# Patient Record
Sex: MALE | Race: WHITE | NOT HISPANIC OR LATINO | URBAN - METROPOLITAN AREA
[De-identification: names, ages, dates, MRNs, and addresses within clinical notes are randomized per-mention and may not be internally consistent; named-entity substitution may affect disease eponyms.]

---

## 2017-08-01 ENCOUNTER — OFFICE VISIT - HEALTHEAST (OUTPATIENT)
Dept: FAMILY MEDICINE | Facility: CLINIC | Age: 37
End: 2017-08-01

## 2017-08-01 DIAGNOSIS — F41.9 ANXIETY: ICD-10-CM

## 2017-08-01 DIAGNOSIS — F19.11 HISTORY OF DRUG ABUSE IN REMISSION (H): ICD-10-CM

## 2017-08-01 DIAGNOSIS — F90.9 ADHD (ATTENTION DEFICIT HYPERACTIVITY DISORDER): ICD-10-CM

## 2017-08-01 DIAGNOSIS — F10.10 ALCOHOL ABUSE: ICD-10-CM

## 2017-08-01 DIAGNOSIS — F33.9 MAJOR DEPRESSION, RECURRENT (H): ICD-10-CM

## 2017-08-01 ASSESSMENT — MIFFLIN-ST. JEOR: SCORE: 1616.98

## 2017-09-15 ENCOUNTER — COMMUNICATION - HEALTHEAST (OUTPATIENT)
Dept: FAMILY MEDICINE | Facility: CLINIC | Age: 37
End: 2017-09-15

## 2017-09-26 ENCOUNTER — OFFICE VISIT - HEALTHEAST (OUTPATIENT)
Dept: FAMILY MEDICINE | Facility: CLINIC | Age: 37
End: 2017-09-26

## 2017-09-26 DIAGNOSIS — F33.9 MAJOR DEPRESSION, RECURRENT (H): ICD-10-CM

## 2017-09-26 DIAGNOSIS — Z23 NEED FOR VACCINATION: ICD-10-CM

## 2017-09-26 DIAGNOSIS — F41.9 ANXIETY: ICD-10-CM

## 2017-09-26 DIAGNOSIS — H61.23 BILATERAL HEARING LOSS DUE TO CERUMEN IMPACTION: ICD-10-CM

## 2017-09-26 ASSESSMENT — MIFFLIN-ST. JEOR: SCORE: 1581.26

## 2017-10-16 ENCOUNTER — COMMUNICATION - HEALTHEAST (OUTPATIENT)
Dept: FAMILY MEDICINE | Facility: CLINIC | Age: 37
End: 2017-10-16

## 2017-10-19 ENCOUNTER — COMMUNICATION - HEALTHEAST (OUTPATIENT)
Dept: FAMILY MEDICINE | Facility: CLINIC | Age: 37
End: 2017-10-19

## 2017-10-19 DIAGNOSIS — F41.9 ANXIETY: ICD-10-CM

## 2017-10-31 ENCOUNTER — OFFICE VISIT - HEALTHEAST (OUTPATIENT)
Dept: FAMILY MEDICINE | Facility: CLINIC | Age: 37
End: 2017-10-31

## 2017-10-31 DIAGNOSIS — Z20.2 EXPOSURE TO STD: ICD-10-CM

## 2017-10-31 ASSESSMENT — MIFFLIN-ST. JEOR: SCORE: 1616.13

## 2017-11-01 LAB — HIV 1+2 AB+HIV1 P24 AG SERPL QL IA: NEGATIVE

## 2017-11-02 LAB — SYPHILIS RPR SCREEN - HISTORICAL: NORMAL

## 2017-11-03 LAB
HSV1 IGG SERPL QL IA: NEGATIVE
HSV2 IGG SERPL QL IA: POSITIVE

## 2017-11-27 ENCOUNTER — COMMUNICATION - HEALTHEAST (OUTPATIENT)
Dept: FAMILY MEDICINE | Facility: CLINIC | Age: 37
End: 2017-11-27

## 2018-02-08 ENCOUNTER — COMMUNICATION - HEALTHEAST (OUTPATIENT)
Dept: FAMILY MEDICINE | Facility: CLINIC | Age: 38
End: 2018-02-08

## 2018-02-08 DIAGNOSIS — F10.10 ALCOHOL ABUSE: ICD-10-CM

## 2018-02-08 DIAGNOSIS — F41.9 ANXIETY: ICD-10-CM

## 2018-02-08 DIAGNOSIS — F33.9 MAJOR DEPRESSION, RECURRENT (H): ICD-10-CM

## 2018-03-27 ENCOUNTER — COMMUNICATION - HEALTHEAST (OUTPATIENT)
Dept: FAMILY MEDICINE | Facility: CLINIC | Age: 38
End: 2018-03-27

## 2018-03-27 DIAGNOSIS — F41.9 ANXIETY: ICD-10-CM

## 2018-03-27 DIAGNOSIS — F10.10 ALCOHOL ABUSE: ICD-10-CM

## 2018-03-27 DIAGNOSIS — F33.9 MAJOR DEPRESSION, RECURRENT (H): ICD-10-CM

## 2019-01-16 ENCOUNTER — COMMUNICATION - HEALTHEAST (OUTPATIENT)
Dept: FAMILY MEDICINE | Facility: CLINIC | Age: 39
End: 2019-01-16

## 2019-01-17 ENCOUNTER — OFFICE VISIT - HEALTHEAST (OUTPATIENT)
Dept: FAMILY MEDICINE | Facility: CLINIC | Age: 39
End: 2019-01-17

## 2019-01-17 DIAGNOSIS — M25.551 HIP PAIN, RIGHT: ICD-10-CM

## 2019-01-17 DIAGNOSIS — F33.41 RECURRENT MAJOR DEPRESSIVE DISORDER, IN PARTIAL REMISSION (H): ICD-10-CM

## 2019-01-17 DIAGNOSIS — Z23 NEED FOR VACCINATION: ICD-10-CM

## 2019-01-17 DIAGNOSIS — R51.9 NONINTRACTABLE HEADACHE, UNSPECIFIED CHRONICITY PATTERN, UNSPECIFIED HEADACHE TYPE: ICD-10-CM

## 2019-01-17 ASSESSMENT — MIFFLIN-ST. JEOR: SCORE: 1743.43

## 2019-01-31 ENCOUNTER — RECORDS - HEALTHEAST (OUTPATIENT)
Dept: ADMINISTRATIVE | Facility: OTHER | Age: 39
End: 2019-01-31

## 2019-02-14 ENCOUNTER — RECORDS - HEALTHEAST (OUTPATIENT)
Dept: ADMINISTRATIVE | Facility: OTHER | Age: 39
End: 2019-02-14

## 2019-05-16 ENCOUNTER — COMMUNICATION - HEALTHEAST (OUTPATIENT)
Dept: FAMILY MEDICINE | Facility: CLINIC | Age: 39
End: 2019-05-16

## 2020-11-18 ENCOUNTER — RECORDS - HEALTHEAST (OUTPATIENT)
Dept: ADMINISTRATIVE | Facility: OTHER | Age: 40
End: 2020-11-18

## 2020-11-25 ENCOUNTER — RECORDS - HEALTHEAST (OUTPATIENT)
Dept: ADMINISTRATIVE | Facility: OTHER | Age: 40
End: 2020-11-25

## 2021-05-27 ENCOUNTER — RECORDS - HEALTHEAST (OUTPATIENT)
Dept: ADMINISTRATIVE | Facility: CLINIC | Age: 41
End: 2021-05-27

## 2021-05-28 NOTE — TELEPHONE ENCOUNTER
Who is calling:  Leo  Reason for Call:  Completed PHQ9    Date of last appointment with primary care:  N/a  Okay to leave a detailed message: No

## 2021-05-28 NOTE — TELEPHONE ENCOUNTER
Little interest or pleasure in doing things: Not at all  Feeling down, depressed, or hopeless: Several days  Trouble falling or staying asleep, or sleeping too much: Not at all  Feeling tired or having little energy: Several days  Poor appetite or overeating: More than half the days  Feeling bad about yourself - or that you are a failure or have let yourself or your family down: Several days  Trouble concentrating on things, such as reading the newspaper or watching television: Several days  Moving or speaking so slowly that other people could have noticed. Or the opposite - being so fidgety or restless that you have been moving around a lot more than usual: Not at all  Thoughts that you would be better off dead, or of hurting yourself in some way: Not at all  PHQ-9 Total Score: 6  If you checked off any problems, how difficult have these problems made it for you to do your work, take care of things at home, or get along with other people?: (Not dfifficult at all)  Depression Follow-up Plan: ([place])

## 2021-05-28 NOTE — TELEPHONE ENCOUNTER
Doesn't look like patient is on any antidepressants.  Can we do a PHQ-9 over the phone and see how he is doing.  Do not see any from his 01/2019 office visit.

## 2021-05-31 VITALS — HEIGHT: 70 IN | BODY MASS INDEX: 22.65 KG/M2 | WEIGHT: 158.25 LBS

## 2021-05-31 VITALS — HEIGHT: 70 IN | WEIGHT: 150.56 LBS | BODY MASS INDEX: 21.55 KG/M2

## 2021-05-31 VITALS — BODY MASS INDEX: 22.68 KG/M2 | HEIGHT: 70 IN | WEIGHT: 158.44 LBS

## 2021-06-02 VITALS — WEIGHT: 188.06 LBS | BODY MASS INDEX: 27.85 KG/M2 | HEIGHT: 69 IN

## 2021-06-12 NOTE — PROGRESS NOTES
Assessment / Impression     1. Anxiety     2. Alcohol abuse  Comprehensive Metabolic Panel   3. Major depression, recurrent     4. ADHD (attention deficit hyperactivity disorder)     5. History of drug abuse in remission         The following are part of a depression follow up plan for the patient:  mental health care assessment    Plan:     I encouraged him to continue his efforts to stop drinking and to seek help for his worsening anxiety depression symptoms.  Recommended that we check a CMP today.  He has a meeting tomorrow morning at the alcohol and drug abuse program ([facility]) where they will be doing a chemical health assessment and help provide some resources.  He also has a meeting with the [facility] crisis team later this week.  He reports that they will be helping connect him with a psychiatrist and likely a therapist.  We discussed treatment options for his current symptoms and he was given a prescription for paroxetine 20 mg daily.  I also refilled the Lorazepam that he may use as needed for acute anxiety episodes.    Subjective:      HPI: Stevie Mcnally is a 36 y.o. male who presents to the clinic for follow-up after being seen in the ER at [facility] on 7/28 for worsening anxiety, depression and alcohol abuse.  He reports that he has been drinking 12-18 beers a day over the past month.  Prior to this he was drinking, but not as heavily about 5 days a week.  He describes being under quite a bit of stress.  His girlfriend of 10 years recently left him.  He has also had some stress at work.  He is an .  He was evaluated and met with the  in the emergency room on 7/28.  He is scheduled to have a meeting with the [facility] crisis team in 2 days.  He reports that they will be helping connect him with the health services such as psychiatry.  He is planning to be evaluated at the alcohol and drug abuse program for chemical health assessment tomorrow morning.  They  "did not check labs in the ER.  He was given a small prescription for Ativan.  He reports only having 1 tablet left 6.  He reports not drinking any alcohol since Sunday.  He has a history of anxiety, depression, ADHD and substance abuse.  He has not abused drugs since 2003.  He has been on Effexor in the past which she did not find helpful, but the dose was never increased from 37.5 mg.  He is also been on Wellbutrin, but this caused heart palpitations.        Review of Systems  All other systems reviewed and are negative.     History   Smoking Status     Current Every Day Smoker     Packs/day: 1.00     Years: 22.00   Smokeless Tobacco     Never Used       Family History   Problem Relation Age of Onset     COPD Father      Heart disease Father      COPD Paternal Grandmother      Brain cancer Paternal Grandfather        Objective:     /80 (Patient Site: Right Arm, Patient Position: Sitting, Cuff Size: Adult Regular)  Pulse (!) 112  Temp 98.9  F (37.2  C) (Oral)   Resp 24  Ht 5' 9.5\" (1.765 m)  Wt 158 lb 7 oz (71.9 kg)  BMI 23.06 kg/m2  Physical Examination: General appearance - alert, well appearing, and in no distress  Eyes: pupils equal and reactive, extraocular eye movements intact  Mouth: mucous membranes moist, pharynx normal without lesions  Neck: supple, no significant adenopathy  Lungs: clear to auscultation, no wheezes, rales or rhonchi, symmetric air entry  Heart: normal rate, regular rhythm, normal S1, S2, no murmurs, rubs, clicks or gallops  Abdomen: soft, nontender, nondistended, no masses or organomegaly  Neurological: alert, oriented, normal speech, no focal findings or movement disorder noted.    Extremities: No edema, no clubbing or cyanosis  Psychiatric: Appears anxious and depressed.  PHQ-9 score is 24.  SILAS-7 score is 17.    No results found for this or any previous visit (from the past 168 hour(s)).    Current Outpatient Prescriptions   Medication Sig     LORazepam (ATIVAN) 1 MG tablet " Take 1 tablet (1 mg total) by mouth every 8 (eight) hours as needed for anxiety.     PARoxetine (PAXIL) 20 MG tablet Take 1 tablet (20 mg total) by mouth daily.

## 2021-06-13 NOTE — PROGRESS NOTES
"Assessment / Impression     1. Bilateral hearing loss due to cerumen impaction     2. Major depression, recurrent     3. Anxiety       The following are part of a depression follow up plan for the patient:  mental health care assessment    Plan:     I personally removed a large amount of cerumen using the lighted spatula in the office today.  The clinical assistant removed the remainder using saline irrigation.  He tolerated this well.  Tips on how to prevent wax buildup in the future were given.    Subjective:      HPI: Stevie Mcnally is a 36 y.o. male who presents to the clinic complaining of bilateral ear plugging with decreased hearing over the past 6 months.  He denies having ear pain or drainage.  He does not feel ill and is not congested.  He has a history of anxiety and depression when she feels like her currently stable.  He takes paroxetine and Lorazepam as needed.        Review of Systems  All other systems reviewed and are negative.     History   Smoking Status     Current Every Day Smoker     Packs/day: 1.00     Years: 22.00   Smokeless Tobacco     Never Used       Family History   Problem Relation Age of Onset     COPD Father      Heart disease Father      COPD Paternal Grandmother      Brain cancer Paternal Grandfather        Objective:     /72 (Patient Site: Right Arm, Patient Position: Sitting, Cuff Size: Adult Regular)  Pulse (!) 108  Temp 98.9  F (37.2  C) (Oral)   Resp 16  Ht 5' 9.5\" (1.765 m)  Wt 150 lb 9 oz (68.3 kg)  BMI 21.92 kg/m2  Physical Examination: General appearance - alert, well appearing, and in no distress  Eyes: extraocular eye movements intact  Ears: Both canals are impacted with cerumen.  Once this was removed the canals and tympanic membranes appeared clear  Neurological: alert, oriented, normal speech, no focal findings or movement disorder noted.    Psychiatric: Normal affect. Does not appear anxious or depressed.    No results found for this or any previous " visit (from the past 168 hour(s)).    Current Outpatient Prescriptions   Medication Sig     LORazepam (ATIVAN) 1 MG tablet Take 1 tablet (1 mg total) by mouth every 8 (eight) hours as needed for anxiety.     PARoxetine (PAXIL) 20 MG tablet Take 1 tablet (20 mg total) by mouth daily.

## 2021-06-13 NOTE — PROGRESS NOTES
"Assessment / Impression     1. Exposure to STD  Chlamydia trachomatis & Neisseria gonorrhoeae, Amplified Detection    HIV Antigen/Antibody Screening [place]    Syphilis Screen, [place]    Herpes simplex Virus (Type 1 and 2) IgG Antibody         Plan:     We decided to check the above STD screening labs.  It is reassuring that he has never had an outbreak consistent with genital herpes.  If he develops this I recommended he return to the clinic to be evaluated.    Subjective:      HPI: Stevie Mcnally is a 37 y.o. male who presents to the clinic because his ex girlfriend recently told him that she tested positive for herpes.  She told him that she has never had an outbreak, but the blood test was positive.  He has never had an outbreak either.  He is not sure if he has ever had a cold sore before.  He denies having any blistering lesions, penile discharge or discomfort.        Review of Systems  All other systems reviewed and are negative.     History   Smoking Status     Current Every Day Smoker     Packs/day: 1.00     Years: 22.00   Smokeless Tobacco     Never Used       Family History   Problem Relation Age of Onset     COPD Father      Heart disease Father      COPD Paternal Grandmother      Brain cancer Paternal Grandfather        Objective:     /66 (Patient Site: Right Arm, Patient Position: Sitting, Cuff Size: Adult Regular)  Pulse 76  Temp 98  F (36.7  C) (Oral)   Resp 16  Ht 5' 9.5\" (1.765 m)  Wt 158 lb 4 oz (71.8 kg)  BMI 23.03 kg/m2  Physical Examination: General appearance - alert, well appearing, and in no distress  Eyes: pupils equal and reactive, extraocular eye movements intact  Mouth: mucous membranes moist, pharynx normal without lesions  Neck: supple, no significant adenopathy  Lungs: clear to auscultation, no wheezes, rales or rhonchi, symmetric air entry  Heart: normal rate, regular rhythm, normal S1, S2, no murmurs, rubs, clicks or gallops  Abdomen: soft, nontender, nondistended, " no masses or organomegaly  Neurological: alert, oriented, normal speech, no focal findings or movement disorder noted.    Psychiatric: Appears mildly anxious and depressed.    No results found for this or any previous visit (from the past 168 hour(s)).    Current Outpatient Prescriptions   Medication Sig     LORazepam (ATIVAN) 1 MG tablet Take 1 tablet (1 mg total) by mouth every 8 (eight) hours as needed for anxiety.     PARoxetine (PAXIL) 20 MG tablet Take 1 tablet (20 mg total) by mouth daily.

## 2021-06-16 PROBLEM — F10.11 ALCOHOL ABUSE, IN REMISSION: Status: ACTIVE | Noted: 2017-08-01

## 2021-06-23 NOTE — TELEPHONE ENCOUNTER
Patient is scheduled for 20 minute appt. at 140  Tomorrow and the note says wanting PHY.  If he wants to have PHY needs 40 minutes and we can change him to the 1200 40 minute appointment currently available if that will work for him. Please help change schedule if appt. Still available when he calls back.

## 2021-06-23 NOTE — PROGRESS NOTES
Assessment / Impression     1. Hip pain, right  Ambulatory referral to Orthopedic Surgery   2. Nonintractable headache, unspecified chronicity pattern, unspecified headache type     3. Recurrent major depressive disorder, in partial remission (H)     4. Need for vaccination  Influenza, Seasonal,Quad Inj, 36+ MOS       The following are part of a depression follow up plan for the patient:  mental health care assessment    Plan:     I expressed my concern that he is having a lot of pain in the right hip.  The range of motion is limited with internal and external hip rotation.  He was given a referral to see an orthopedic specialist for further workup.  We decided to skip doing x-rays in our clinic since they will likely be getting their own images once he sees the specialist.  He may take Tylenol as needed and modify activities until he is feeling better.    The underlying cause of his headache symptoms is not entirely clear, but it seems like his energy drink consumption is likely contributing to this.  Stress and anxiety may also be contributing factor.  I recommended he get plenty of rest, regular exercise and eat a healthy diet.  He is going to keep a headache journal to help identify triggers.  He is also going to keep working on limiting caffeine and energy drink consumption.    His anxiety and depression symptoms appear to be well controlled without medication.  We will keep an eye on these issues at future appointments.    Subjective:      HPI: Stevie Mcnally is a 38 y.o. male who presents to the clinic to discuss a couple of concerns.  He describes having a long history of right-sided hip pain that has been getting worse over the past 2 months.  He denies any specific injury that may have caused this.  He reports that there is now pain shooting into the right thigh causing some knee discomfort.  He attributes this to changing the way he walks.  He also has radiation into the groin area.  He remembers as a  "child needing to have fluid drained from his right hip, but he does not know what the underlying diagnosis was.  He was about 4-6 years old at the time.  His symptoms are currently worse with prolonged use.  He has tried ibuprofen, but this can be hard on his stomach.    He is also concerned about headaches that have been occurring over the past month.  The often will wake him up morning and start in the front of his head and moved towards the back of his head.  They usually get better throughout the day.  He denies nausea, vomiting, photophobia or phonophobia symptoms with the headaches.  He has noticed that increasing his energy drink consumption may be making this worse.  He has a history of alcohol abuse, but he has been sober for over a year.  He has not had any changes in vision, speech, cognition or balance.  He denies weakness on one side of his body.        Review of Systems  All other systems reviewed and are negative.     Social History     Tobacco Use   Smoking Status Current Every Day Smoker     Packs/day: 1.00     Years: 22.00     Pack years: 22.00   Smokeless Tobacco Never Used       Family History   Problem Relation Age of Onset     COPD Father      Heart disease Father      COPD Paternal Grandmother      Brain cancer Paternal Grandfather        Objective:     /74 (Patient Site: Right Arm, Patient Position: Sitting, Cuff Size: Adult Large)   Pulse 80   Temp 98.7  F (37.1  C) (Oral)   Resp 16   Ht 5' 9\" (1.753 m)   Wt 188 lb 1 oz (85.3 kg)   BMI 27.77 kg/m    Physical Examination: General appearance - alert, well appearing, and in no distress  Eyes: pupils equal and reactive, extraocular eye movements intact  Ears: Tympanic membranes clear bilaterally  Mouth: mucous membranes moist, pharynx normal without lesions  Neck: supple, no significant adenopathy  Lungs: clear to auscultation, no wheezes, rales or rhonchi, symmetric air entry  Heart: normal rate, regular rhythm, normal S1, S2, no " murmurs, rubs, clicks or gallops  Neurological: alert, oriented, normal speech, no focal findings or movement disorder noted.  Deep tendon reflexes 2 out of 4 bilaterally  Extremities: No edema, no clubbing or cyanosis.  Internal and external hip rotation reveals decreased range of motion bilaterally, worse on the right.  These maneuvers on the right cause pain especially with internal rotation.  Straight leg raise negative.  Back: Nontender over the cervical, thoracic and lumbar spinous processes.  Nontender over the SI joints  Psychiatric: Normal affect. Does not appear anxious or depressed.  PHQ-9 score is 4.  SILAS-7 score is 2.    No results found for this or any previous visit (from the past 168 hour(s)).    No current outpatient medications on file.

## 2021-07-03 NOTE — ADDENDUM NOTE
Addendum Note by Shantanu Anne CMA at 9/26/2017  3:34 PM     Author: Shantanu Anne CMA Service: -- Author Type: Certified Medical Assistant    Filed: 9/26/2017  3:34 PM Encounter Date: 9/26/2017 Status: Signed    : Shantanu Anne CMA (Certified Medical Assistant)    Addended by: SHANTANU ANNE on: 9/26/2017 03:34 PM        Modules accepted: Orders

## 2021-08-22 ENCOUNTER — HEALTH MAINTENANCE LETTER (OUTPATIENT)
Age: 41
End: 2021-08-22

## 2021-10-17 ENCOUNTER — HEALTH MAINTENANCE LETTER (OUTPATIENT)
Age: 41
End: 2021-10-17

## 2022-01-20 NOTE — PATIENT INSTRUCTIONS
Preventive Health Recommendations  Male Ages 40 to 49    Yearly exam:             See your health care provider every year in order to  o   Review health changes.   o   Discuss preventive care.    o   Review your medicines if your doctor has prescribed any.    You should be tested each year for STDs (sexually transmitted diseases) if you re at risk.     Have a cholesterol test every 5 years.     Have a colonoscopy (test for colon cancer) if someone in your family has had colon cancer or polyps before age 50.     After age 45, have a diabetes test (fasting glucose). If you are at risk for diabetes, you should have this test every 3 years.      Talk with your health care provider about whether or not a prostate cancer screening test (PSA) is right for you.    Shots: Get a flu shot each year. Get a tetanus shot every 10 years.     Nutrition:    Eat at least 5 servings of fruits and vegetables daily.     Eat whole-grain bread, whole-wheat pasta and brown rice instead of white grains and rice.     Get adequate Calcium and Vitamin D.     Lifestyle    Exercise for at least 150 minutes a week (30 minutes a day, 5 days a week). This will help you control your weight and prevent disease.     Limit alcohol to one drink per day.     No smoking.     Wear sunscreen to prevent skin cancer.     See your dentist every six months for an exam and cleaning.      HOW TO QUIT SMOKING  Smoking is one of the hardest habits to break. About half of all those who have ever smoked have been able to quit, and most of those (about 70%) who still smoke want to quit. Here are some of the best ways to stop smoking.     KEEP TRYING:  It takes most smokers about 8 tries before they are finally able to fully quit. So, the more often you try and fail, the better your chance of quitting the next time! So, don't give up!    GO COLD TURKEY:  Most ex-smokers quit cold turkey. Trying to cut back gradually doesn't seem to work as well, perhaps because it  continues the smoking habit. Also, it is possible to fool yourself by inhaling more while smoking fewer cigarettes. This results in the same amount of nicotine in your body!    GET SUPPORT:  Support programs can make an important difference, especially for the heavy smoker. These groups offer lectures, methods to change your behavior and peer support. Call the free national Quitline for more information. 800-QUIT-NOW (758-414-0962). Low-cost or free programs are offered by many hospitals, local chapters of the American Lung Association (901-914-3283) and the American Cancer Society (564-812-1095). Support at home is important too. Non-smokers can help by offering praise and encouragement. If the smoker fails to quit, encourage them to try again!    OVER-THE-COUNTER MEDICINES:  For those who can't quit on their own, Nicotine Replacement Therapy (NRT) may make quitting much easier. Certain aids such as the nicotine patch, gum and lozenge are available without a prescription. However, it is best to use these under the guidance of your doctor. The skin patch provides a steady supply of nicotine to the body. Nicotine gum and lozenge gives temporary bursts of low levels of nicotine. Both methods take the edge off the craving for cigarettes. WARNING: If you feel symptoms of nicotine overdose, such as nausea, vomiting, dizziness, weakness, or fast heartbeat, stop using these and see your doctor.    PRESCRIPTION MEDICINES:  After evaluating your smoking patterns and prior attempts at quitting, your doctor may offer a prescription medicine such as bupropion (Zyban, Wellbutrin), varenicline (Chantix, Champix), a niocotine inhaler or nasal spray. Each has its unique advantage and side effects which your doctor can review with you.    HEALTH BENEFITS OF QUITTING:  The benefits of quitting start right away and keep improving the longer you go without smokin minutes: blood pressure and pulse return to normal  8 hours: oxygen  levels return to normal  2 days: ability to smell and taste begins to improve as damaged nerves start to regrow  2-3 weeks: circulation and lung function improves  1-9 months: decreased cough, congestion and shortness of breath; less tired  1 year: risk of heart attack decreases by half  5 years: risk of lung cancer decreases by half; risk of stroke becomes the same as a non-smoker  For information about how to quit smoking, visit the following links:  [employer] [place] ,   Clearing the Air, Quit Smoking Today   - an online booklet. http://www.smokefree.gov/pubs/clearing_the_air.pdf  Smokefree.gov http://smokefree.gov/  QuitNet http://www.quitnet.com/    9491-7609 Christ Alva, [address] [place] [address], [place] [address]. All rights reserved. This information is not intended as a substitute for professional medical care. Always follow your healthcare professional's instructions.

## 2022-01-20 NOTE — PROGRESS NOTES
SUBJECTIVE:   CC: Stevie Mcnally is an 41 year old male who presents for preventative health visit.     {  Patient has been advised of split billing requirements and indicates understanding: Yes  Healthy Habits:     Getting at least 3 servings of Calcium per day:  Yes    Bi-annual eye exam:  Yes    Dental care twice a year:  NO    Sleep apnea or symptoms of sleep apnea:  None    Diet:  Regular (no restrictions)    Frequency of exercise:  1 day/week    Duration of exercise:  Less than 15 minutes    Taking medications regularly:  Yes    Medication side effects:  Not applicable    PHQ-2 Total Score: 0    He has a medical history significant for anxiety, depression, history of drug and alcohol abuse in remission x4 years and hyperlipidemia.  He is concerned today about chest tightness symptoms and left abdominal pain symptoms he has been experiencing on the left side for the past couple of months.  Occasionally he feels short of breath with some chest discomfort.  He denies coughing or feeling ill.  He is not having fevers or congestion symptoms.  He denies heart palpitations.  Symptoms are worse with caffeine and energy drink consumption.  He admits that he has been feeling anxious about this.  His mother  of lung cancer back in September.  He continues to smoke about 1 pack of cigarettes daily.  He denies any problems with urination or bowel movements.    Answers for HPI/ROS submitted by the patient on 2022  If you checked off any problems, how difficult have these problems made it for you to do your work, take care of things at home, or get along with other people?: Not difficult at all  PHQ9 TOTAL SCORE: 1              Today's PHQ-2 Score:   PHQ-2 (  [employer]) 2022   Q1: Little interest or pleasure in doing things 0   Q2: Feeling down, depressed or hopeless 0   PHQ-2 Score 0   Q1: Little interest or pleasure in doing things Not at all   Q2: Feeling down, depressed or hopeless Not at all   PHQ-2  Score 0       Abuse: Current or Past(Physical, Sexual or Emotional)- No  Do you feel safe in your environment? Yes    Have you ever done Advance Care Planning? (For example, a Health Directive, POLST, or a discussion with a medical provider or your loved ones about your wishes): No, advance care planning information given to patient to review.  Patient declined advance care planning discussion at this time.    Social History     Tobacco Use     Smoking status: Current Every Day Smoker     Packs/day: 1.00     Years: 22.00     Pack years: 22.00     Types: Cigarettes     Smokeless tobacco: Never Used     Tobacco comment: Need to quit.   Substance Use Topics     Alcohol use: Not Currently     Alcohol/week: 4.2 standard drinks     Comment: Over 4 years sober.     If you drink alcohol do you typically have >3 drinks per day or >7 drinks per week? No    Alcohol Use 1/24/2022   Prescreen: >3 drinks/day or >7 drinks/week? Not Applicable   Prescreen: >3 drinks/day or >7 drinks/week? -   No flowsheet data found.    Last PSA: No results found for: PSA    Reviewed orders with patient. Reviewed health maintenance and updated orders accordingly - Yes  Lab work is in process    Reviewed and updated as needed this visit by clinical staff                Reviewed and updated as needed this visit by Provider                   Review of Systems   Constitutional: Negative for chills and fever.   HENT: Negative for congestion, ear pain, hearing loss and sore throat.    Eyes: Negative for pain and visual disturbance.   Respiratory: Positive for shortness of breath. Negative for cough.    Cardiovascular: Positive for chest pain. Negative for palpitations and peripheral edema.   Gastrointestinal: Positive for abdominal pain and heartburn. Negative for constipation, diarrhea, hematochezia and nausea.   Genitourinary: Negative for dysuria, frequency, genital sores, hematuria, impotence, penile discharge and urgency.   Musculoskeletal: Positive  "for arthralgias. Negative for joint swelling and myalgias.   Skin: Negative for rash.   Neurological: Positive for weakness and headaches. Negative for dizziness and paresthesias.   Psychiatric/Behavioral: Negative for mood changes. The patient is not nervous/anxious.          OBJECTIVE:   /72   Pulse 83   Temp 97.7  F (36.5  C) (Temporal)   Ht 1.749 m (5' 8.86\")   Wt 77 kg (169 lb 12.8 oz)   SpO2 97%   BMI 25.18 kg/m      Physical Exam  GENERAL: healthy, alert and no distress  EYES: Eyes grossly normal to inspection, PERRL and conjunctivae and sclerae normal  HENT: ear canals and TM's normal, nose and mouth without ulcers or lesions  NECK: no adenopathy, no asymmetry, masses, or scars and thyroid normal to palpation  RESP: lungs clear to auscultation - no rales, rhonchi or wheezes  CV: regular rate and rhythm, normal S1 S2, no S3 or S4, no murmur, click or rub, no peripheral edema and peripheral pulses strong  ABDOMEN: soft, nontender, no hepatosplenomegaly, no masses and bowel sounds normal  MS: no gross musculoskeletal defects noted, no edema  SKIN: no suspicious lesions or rashes  NEURO: Normal strength and tone, mentation intact and speech normal  PSYCH: mentation appears normal, affect normal/bright    Diagnostic Test Results:  Labs reviewed in Epic    ASSESSMENT/PLAN:   1. Routine general medical examination at a health care facility  I encouraged him to get regular exercise and eat a healthy diet.  We are going to check labs as listed below.    2. Chest tightness  I recommended checking a chest x-ray.  I personally reviewed the images with him and provided an initial interpretation.  No acute infiltrates or abnormalities seen.  The formal radiology report is pending.  I also recommended checking an EKG which showed sinus rhythm with an incomplete right bundle branch block.  No ST-T wave changes concerning for ACS.  I recommended checking labs including a CBC, CMP and TSH.  He will be notified of " "the results when they are available.  I recommended he cut back on his caffeine intake which appears to be contributing to this.  We also discussed smoking cessation and I encouraged him to quit.  - XR Chest 2 Views; Future  - EKG 12-lead complete w/read - Clinics  - TSH with free T4 reflex; Future    3. LLQ abdominal pain  I recommended checking labs.  This seems to be occurring with the chest tightness symptoms.  He is going to limit caffeine intake and eat a healthy diet to see if this helps.  - Comprehensive metabolic panel (BMP + Alb, Alk Phos, ALT, AST, Total. Bili, TP); Future  - CBC with platelets; Future  - Lipase; Future    4. Tobacco abuse  We discussed smoking cessation and I encouraged him to quit.    5. Family history of thyroid disorder  We will be checking thyroid cascade as part of today's laboratory work-up.  Thyroid disease runs in his mother and grandmother.    6. Encounter for hepatitis C screening test for low risk patient  We will be checking hepatitis C antibody test today.    7. Screening cholesterol level  - Lipid Profile (Chol, Trig, HDL, LDL calc); Future    8. Anxiety  He will work on lifestyle modifications help with anxiety symptoms.    9. Recurrent major depressive disorder, in full remission (H)  He will work on lifestyle modifications help with depression symptoms which she reports have been stable.    10. Mixed hyperlipidemia  He will work on lifestyle modifications to help lower the cholesterol.          COUNSELING:   Reviewed preventive health counseling, as reflected in patient instructions       Regular exercise       Healthy diet/nutrition       Consider Hep C screening for all patients one time for ages 18-79 years    Estimated body mass index is 25.18 kg/m  as calculated from the following:    Height as of this encounter: 1.749 m (5' 8.86\").    Weight as of this encounter: 77 kg (169 lb 12.8 oz).     Weight management plan: Discussed healthy diet and exercise " guidelines    He reports that he has been smoking cigarettes. He has a 22.00 pack-year smoking history. He has never used smokeless tobacco.  Tobacco Cessation Action Plan:   We discussed smoking cessation and I encouraged him to quit.      Counseling Resources:  ATP IV Guidelines  Pooled Cohorts Equation Calculator  FRAX Risk Assessment  ICSI Preventive Guidelines  Dietary Guidelines for Americans, 2010  USDA's MyPlate  ASA Prophylaxis  Lung CA Screening    Brant Mathews, [facility]

## 2022-01-24 ENCOUNTER — OFFICE VISIT (OUTPATIENT)
Dept: FAMILY MEDICINE | Facility: CLINIC | Age: 42
End: 2022-01-24
Payer: COMMERCIAL

## 2022-01-24 ENCOUNTER — ANCILLARY PROCEDURE (OUTPATIENT)
Dept: GENERAL RADIOLOGY | Facility: CLINIC | Age: 42
End: 2022-01-24
Attending: FAMILY MEDICINE
Payer: COMMERCIAL

## 2022-01-24 VITALS
OXYGEN SATURATION: 97 % | SYSTOLIC BLOOD PRESSURE: 112 MMHG | HEIGHT: 69 IN | TEMPERATURE: 97.7 F | DIASTOLIC BLOOD PRESSURE: 72 MMHG | BODY MASS INDEX: 25.15 KG/M2 | WEIGHT: 169.8 LBS | HEART RATE: 83 BPM

## 2022-01-24 DIAGNOSIS — E78.2 MIXED HYPERLIPIDEMIA: ICD-10-CM

## 2022-01-24 DIAGNOSIS — F17.200 NICOTINE DEPENDENCE, UNCOMPLICATED, UNSPECIFIED NICOTINE PRODUCT TYPE: ICD-10-CM

## 2022-01-24 DIAGNOSIS — F41.9 ANXIETY: ICD-10-CM

## 2022-01-24 DIAGNOSIS — R10.32 LLQ ABDOMINAL PAIN: ICD-10-CM

## 2022-01-24 DIAGNOSIS — Z00.00 ROUTINE GENERAL MEDICAL EXAMINATION AT A HEALTH CARE FACILITY: Primary | ICD-10-CM

## 2022-01-24 DIAGNOSIS — F33.42 RECURRENT MAJOR DEPRESSIVE DISORDER, IN FULL REMISSION (H): ICD-10-CM

## 2022-01-24 DIAGNOSIS — Z83.49 FAMILY HISTORY OF THYROID DISORDER: ICD-10-CM

## 2022-01-24 DIAGNOSIS — Z11.59 ENCOUNTER FOR HEPATITIS C SCREENING TEST FOR LOW RISK PATIENT: ICD-10-CM

## 2022-01-24 DIAGNOSIS — R07.89 CHEST TIGHTNESS: ICD-10-CM

## 2022-01-24 DIAGNOSIS — Z13.220 SCREENING CHOLESTEROL LEVEL: ICD-10-CM

## 2022-01-24 LAB
ERYTHROCYTE [DISTWIDTH] IN BLOOD BY AUTOMATED COUNT: 12.2 % (ref 10–15)
HCT VFR BLD AUTO: 40.7 % (ref 40–53)
HGB BLD-MCNC: 14.2 G/DL (ref 13.3–17.7)
LIPASE SERPL-CCNC: 68 U/L (ref 73–393)
MCH RBC QN AUTO: 32.6 PG (ref 26.5–33)
MCHC RBC AUTO-ENTMCNC: 34.9 G/DL (ref 31.5–36.5)
MCV RBC AUTO: 93 FL (ref 78–100)
PLATELET # BLD AUTO: 319 10E3/UL (ref 150–450)
RBC # BLD AUTO: 4.36 10E6/UL (ref 4.4–5.9)
WBC # BLD AUTO: 8.6 10E3/UL (ref 4–11)

## 2022-01-24 PROCEDURE — 99396 PREV VISIT EST AGE 40-64: CPT | Mod: 25 | Performed by: FAMILY MEDICINE

## 2022-01-24 PROCEDURE — 99214 OFFICE O/P EST MOD 30 MIN: CPT | Mod: 25 | Performed by: FAMILY MEDICINE

## 2022-01-24 PROCEDURE — 84443 ASSAY THYROID STIM HORMONE: CPT | Performed by: FAMILY MEDICINE

## 2022-01-24 PROCEDURE — 80053 COMPREHEN METABOLIC PANEL: CPT | Performed by: FAMILY MEDICINE

## 2022-01-24 PROCEDURE — 86803 HEPATITIS C AB TEST: CPT | Performed by: FAMILY MEDICINE

## 2022-01-24 PROCEDURE — 80061 LIPID PANEL: CPT | Performed by: FAMILY MEDICINE

## 2022-01-24 PROCEDURE — 85027 COMPLETE CBC AUTOMATED: CPT | Performed by: FAMILY MEDICINE

## 2022-01-24 PROCEDURE — 83690 ASSAY OF LIPASE: CPT | Performed by: FAMILY MEDICINE

## 2022-01-24 PROCEDURE — 71046 X-RAY EXAM CHEST 2 VIEWS: CPT | Mod: FY | Performed by: RADIOLOGY

## 2022-01-24 PROCEDURE — 36415 COLL VENOUS BLD VENIPUNCTURE: CPT | Performed by: FAMILY MEDICINE

## 2022-01-24 PROCEDURE — 90686 IIV4 VACC NO PRSV 0.5 ML IM: CPT | Performed by: FAMILY MEDICINE

## 2022-01-24 PROCEDURE — 90471 IMMUNIZATION ADMIN: CPT | Performed by: FAMILY MEDICINE

## 2022-01-24 PROCEDURE — 93000 ELECTROCARDIOGRAM COMPLETE: CPT | Performed by: FAMILY MEDICINE

## 2022-01-24 ASSESSMENT — ENCOUNTER SYMPTOMS
DIARRHEA: 0
ARTHRALGIAS: 1
PALPITATIONS: 0
HEADACHES: 1
ABDOMINAL PAIN: 1
DIZZINESS: 0
NERVOUS/ANXIOUS: 0
SORE THROAT: 0
HEARTBURN: 1
FEVER: 0
NAUSEA: 0
DYSURIA: 0
CONSTIPATION: 0
MYALGIAS: 0
CHILLS: 0
JOINT SWELLING: 0
WEAKNESS: 1
PARESTHESIAS: 0
HEMATURIA: 0
EYE PAIN: 0
COUGH: 0
FREQUENCY: 0
SHORTNESS OF BREATH: 1
HEMATOCHEZIA: 0

## 2022-01-24 ASSESSMENT — PATIENT HEALTH QUESTIONNAIRE - PHQ9
SUM OF ALL RESPONSES TO PHQ QUESTIONS 1-9: 1
10. IF YOU CHECKED OFF ANY PROBLEMS, HOW DIFFICULT HAVE THESE PROBLEMS MADE IT FOR YOU TO DO YOUR WORK, TAKE CARE OF THINGS AT HOME, OR GET ALONG WITH OTHER PEOPLE: NOT DIFFICULT AT ALL
SUM OF ALL RESPONSES TO PHQ QUESTIONS 1-9: 1

## 2022-01-24 ASSESSMENT — MIFFLIN-ST. JEOR: SCORE: 1653.33

## 2022-01-25 LAB
ALBUMIN SERPL-MCNC: 3.8 G/DL (ref 3.4–5)
ALP SERPL-CCNC: 97 U/L (ref 40–150)
ALT SERPL W P-5'-P-CCNC: 26 U/L (ref 0–70)
ANION GAP SERPL CALCULATED.3IONS-SCNC: 5 MMOL/L (ref 3–14)
AST SERPL W P-5'-P-CCNC: 13 U/L (ref 0–45)
BILIRUB SERPL-MCNC: 0.3 MG/DL (ref 0.2–1.3)
BUN SERPL-MCNC: 14 MG/DL (ref 7–30)
CALCIUM SERPL-MCNC: 9 MG/DL (ref 8.5–10.1)
CHLORIDE BLD-SCNC: 106 MMOL/L (ref 94–109)
CHOLEST SERPL-MCNC: 197 MG/DL
CO2 SERPL-SCNC: 28 MMOL/L (ref 20–32)
CREAT SERPL-MCNC: 0.75 MG/DL (ref 0.66–1.25)
FASTING STATUS PATIENT QL REPORTED: NO
GFR SERPL CREATININE-BSD FRML MDRD: >90 ML/MIN/1.73M2
GLUCOSE BLD-MCNC: 97 MG/DL (ref 70–99)
HCV AB SERPL QL IA: NONREACTIVE
HDLC SERPL-MCNC: 28 MG/DL
LDLC SERPL CALC-MCNC: 109 MG/DL
NONHDLC SERPL-MCNC: 169 MG/DL
POTASSIUM BLD-SCNC: 3.8 MMOL/L (ref 3.4–5.3)
PROT SERPL-MCNC: 7.1 G/DL (ref 6.8–8.8)
SODIUM SERPL-SCNC: 139 MMOL/L (ref 133–144)
TRIGL SERPL-MCNC: 302 MG/DL
TSH SERPL DL<=0.005 MIU/L-ACNC: 1.58 MU/L (ref 0.4–4)

## 2022-10-02 ENCOUNTER — HEALTH MAINTENANCE LETTER (OUTPATIENT)
Age: 42
End: 2022-10-02

## 2023-01-24 ENCOUNTER — OFFICE VISIT (OUTPATIENT)
Dept: FAMILY MEDICINE | Facility: CLINIC | Age: 43
End: 2023-01-24
Payer: COMMERCIAL

## 2023-01-24 ENCOUNTER — ANCILLARY PROCEDURE (OUTPATIENT)
Dept: GENERAL RADIOLOGY | Facility: CLINIC | Age: 43
End: 2023-01-24
Attending: FAMILY MEDICINE
Payer: COMMERCIAL

## 2023-01-24 VITALS
WEIGHT: 178 LBS | BODY MASS INDEX: 26.36 KG/M2 | RESPIRATION RATE: 19 BRPM | HEIGHT: 69 IN | DIASTOLIC BLOOD PRESSURE: 78 MMHG | SYSTOLIC BLOOD PRESSURE: 108 MMHG | HEART RATE: 76 BPM | OXYGEN SATURATION: 98 % | TEMPERATURE: 97.9 F

## 2023-01-24 DIAGNOSIS — R07.89 LEFT-SIDED CHEST WALL PAIN: Primary | ICD-10-CM

## 2023-01-24 DIAGNOSIS — R10.12 LUQ ABDOMINAL PAIN: ICD-10-CM

## 2023-01-24 DIAGNOSIS — R20.0 NUMBNESS AND TINGLING OF BOTH FEET: ICD-10-CM

## 2023-01-24 DIAGNOSIS — R07.89 LEFT-SIDED CHEST WALL PAIN: ICD-10-CM

## 2023-01-24 DIAGNOSIS — R20.2 NUMBNESS AND TINGLING OF BOTH FEET: ICD-10-CM

## 2023-01-24 LAB
ALBUMIN SERPL BCG-MCNC: 4.7 G/DL (ref 3.5–5.2)
ALP SERPL-CCNC: 104 U/L (ref 40–129)
ALT SERPL W P-5'-P-CCNC: 28 U/L (ref 10–50)
ANION GAP SERPL CALCULATED.3IONS-SCNC: 12 MMOL/L (ref 7–15)
AST SERPL W P-5'-P-CCNC: 31 U/L (ref 10–50)
BILIRUB SERPL-MCNC: 0.4 MG/DL
BUN SERPL-MCNC: 11.3 MG/DL (ref 6–20)
CALCIUM SERPL-MCNC: 9.5 MG/DL (ref 8.6–10)
CHLORIDE SERPL-SCNC: 103 MMOL/L (ref 98–107)
CREAT SERPL-MCNC: 0.75 MG/DL (ref 0.67–1.17)
DEPRECATED HCO3 PLAS-SCNC: 25 MMOL/L (ref 22–29)
ERYTHROCYTE [DISTWIDTH] IN BLOOD BY AUTOMATED COUNT: 12.1 % (ref 10–15)
GFR SERPL CREATININE-BSD FRML MDRD: >90 ML/MIN/1.73M2
GLUCOSE SERPL-MCNC: 88 MG/DL (ref 70–99)
HBA1C MFR BLD: 5.5 % (ref 0–5.6)
HCT VFR BLD AUTO: 45.5 % (ref 40–53)
HGB BLD-MCNC: 15.7 G/DL (ref 13.3–17.7)
LIPASE SERPL-CCNC: 19 U/L (ref 13–60)
MCH RBC QN AUTO: 32.7 PG (ref 26.5–33)
MCHC RBC AUTO-ENTMCNC: 34.5 G/DL (ref 31.5–36.5)
MCV RBC AUTO: 95 FL (ref 78–100)
PLATELET # BLD AUTO: 328 10E3/UL (ref 150–450)
POTASSIUM SERPL-SCNC: 4.6 MMOL/L (ref 3.4–5.3)
PROT SERPL-MCNC: 7.2 G/DL (ref 6.4–8.3)
RBC # BLD AUTO: 4.8 10E6/UL (ref 4.4–5.9)
SODIUM SERPL-SCNC: 140 MMOL/L (ref 136–145)
TSH SERPL DL<=0.005 MIU/L-ACNC: 2.31 UIU/ML (ref 0.3–4.2)
VIT B12 SERPL-MCNC: 921 PG/ML (ref 232–1245)
WBC # BLD AUTO: 11.1 10E3/UL (ref 4–11)

## 2023-01-24 PROCEDURE — 82607 VITAMIN B-12: CPT | Performed by: FAMILY MEDICINE

## 2023-01-24 PROCEDURE — 80053 COMPREHEN METABOLIC PANEL: CPT | Performed by: FAMILY MEDICINE

## 2023-01-24 PROCEDURE — 99214 OFFICE O/P EST MOD 30 MIN: CPT | Mod: 25 | Performed by: FAMILY MEDICINE

## 2023-01-24 PROCEDURE — 83690 ASSAY OF LIPASE: CPT | Performed by: FAMILY MEDICINE

## 2023-01-24 PROCEDURE — 36415 COLL VENOUS BLD VENIPUNCTURE: CPT | Performed by: FAMILY MEDICINE

## 2023-01-24 PROCEDURE — 93005 ELECTROCARDIOGRAM TRACING: CPT | Performed by: FAMILY MEDICINE

## 2023-01-24 PROCEDURE — 90471 IMMUNIZATION ADMIN: CPT | Performed by: FAMILY MEDICINE

## 2023-01-24 PROCEDURE — 71046 X-RAY EXAM CHEST 2 VIEWS: CPT | Mod: TC | Performed by: RADIOLOGY

## 2023-01-24 PROCEDURE — 90472 IMMUNIZATION ADMIN EACH ADD: CPT | Performed by: FAMILY MEDICINE

## 2023-01-24 PROCEDURE — 85027 COMPLETE CBC AUTOMATED: CPT | Performed by: FAMILY MEDICINE

## 2023-01-24 PROCEDURE — 90686 IIV4 VACC NO PRSV 0.5 ML IM: CPT | Performed by: FAMILY MEDICINE

## 2023-01-24 PROCEDURE — 83036 HEMOGLOBIN GLYCOSYLATED A1C: CPT | Performed by: FAMILY MEDICINE

## 2023-01-24 PROCEDURE — 93010 ELECTROCARDIOGRAM REPORT: CPT | Performed by: INTERNAL MEDICINE

## 2023-01-24 PROCEDURE — 84443 ASSAY THYROID STIM HORMONE: CPT | Performed by: FAMILY MEDICINE

## 2023-01-24 PROCEDURE — 90677 PCV20 VACCINE IM: CPT | Performed by: FAMILY MEDICINE

## 2023-01-24 ASSESSMENT — PATIENT HEALTH QUESTIONNAIRE - PHQ9
SUM OF ALL RESPONSES TO PHQ QUESTIONS 1-9: 3
SUM OF ALL RESPONSES TO PHQ QUESTIONS 1-9: 3
10. IF YOU CHECKED OFF ANY PROBLEMS, HOW DIFFICULT HAVE THESE PROBLEMS MADE IT FOR YOU TO DO YOUR WORK, TAKE CARE OF THINGS AT HOME, OR GET ALONG WITH OTHER PEOPLE: SOMEWHAT DIFFICULT

## 2023-01-24 NOTE — PROGRESS NOTES
"  Assessment & Plan     Left-sided chest wall pain  LUQ abdominal pain  Pt with intermittent left chest pains and upper left abdominal discomfort for the past month. Given famhx of lung cancer and personal hx of 30pkyr smoker (with no increase in SOB nor change to his smokers cough) will be prudent to eval with CXR today; cannot fully r/o cardiac etiology at this point as it does occasionally occur with exertion however also at rest so this is more atypical. Will get EKG. Consideration for stress testing down the road. Will also draw labs today.   We discussed possibility for increased stress and reflux related to high intake of energy drinks.  Encourage smoking cessation and dietary modifications as well as keeping a journal of symptoms for more clarifying triggers.  He will follow-up with his PCP in about a month and see if a trial of Pepcid makes any difference in some of the symptoms.  If worsening would advise further evaluation sooner.  - REVIEW OF HEALTH MAINTENANCE PROTOCOL ORDERS  - Pneumococcal 20 Valent Conjugate (Prevnar 20); Future  - INFLUENZA VACCINE IM > 6 MONTHS VALENT IIV4 (AFLURIA/FLUZONE); Future  - XR Chest 2 Views; Future  - EKG 12-lead, tracing only: read by me- NSR, no acute ST or T wave changes, no pathologic q waves; VR 71, normal Tc.   - Comprehensive metabolic panel (BMP + Alb, Alk Phos, ALT, AST, Total. Bili, TP); Future  - Lipase; Future  - CBC with platelets; Future    Checking TSH, A1c and VitB12 for the chronic numbness/tingling in his feet that he brought up today as well.      Nicotine/Tobacco Cessation:  He reports that he has been smoking cigarettes. He has a 22.00 pack-year smoking history. He has never used smokeless tobacco.  Nicotine/Tobacco Cessation Plan:   Self help information given to patient      BMI:   Estimated body mass index is 26.29 kg/m  as calculated from the following:    Height as of this encounter: 1.753 m (5' 9\").    Weight as of this encounter: 80.7 kg (178 " "lb).         Return in about 4 weeks (around 2023) for Recheck.    Tricia Bonds MD  [facility]    Darren Ruelas is a 42 year old, presenting for the following health issues:  Abdominal Pain and Pains  (Pains for about four weeks. )    Gradual onset of intermittent chest wall pain on the left side radiating to the LUQ.   He describes for 2 to 3 days he will have intermittent pains in the left chest wall and left abdominal/flank area and this will come and go over the course of 20 minutes to 2 hours and after those 2 to 3 days the episode seems to resolve and then may come back the next week.  He has had a few of these episodes been in the past month.  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 No bloody stools or mucus  Normal urination    Smokes 1ppd x 30 years approx.   Quit alcohol 5yr ago   2-5 energy drinks per day for 5 years after quitting alcohol; working to cut this back in the past week (none yet today)  No substance use    Numbness/tingling in his feet over the past few years that is more noticeable now    His mom  of small cell lung cancer 1 year ago (she was also smoking); he wonders if this plays a role for his feelings but doesn't seem like it's \"somatic\"    BP has always been low     Weights are stable (shoes on today added 7lbs)  No nighttime sweats    Wt Readings from Last 3 Encounters:   23 80.7 kg (178 lb)   22 77 kg (169 lb 12.8 oz)   19 85.3 kg (188 " "lb 1 oz)         History of Present Illness       Reason for visit:  Abdominal/chest pain  Symptom onset:  3-7 days ago  Symptoms include:  Left side chest pain, I'm breathing normally  Symptom intensity:  Moderate  Symptom progression:  Staying the same  Had these symptoms before:  Yes  Has tried/received treatment for these symptoms:  No  What makes it worse:  Stress  What makes it better:  Sleeping    He eats 2-3 servings of fruits and vegetables daily.He consumes 4 sweetened beverage(s) daily.He exercises with enough effort to increase his heart rate 10 to 19 minutes per day.  He exercises with enough effort to increase his heart rate 5 days per week.   He is taking medications regularly.    Today's PHQ-9         PHQ-9 Total Score: 3    PHQ-9 Q9 Thoughts of better off dead/self-harm past 2 weeks :   Not at all    How difficult have these problems made it for you to do your work, take care of things at home, or get along with other people: Somewhat difficult             Review of Systems         Objective    /78   Pulse 76   Temp 97.9  F (36.6  C) (Temporal)   Resp 19   Ht 1.753 m (5' 9\")   Wt 80.7 kg (178 lb)   SpO2 98%   BMI 26.29 kg/m    Body mass index is 26.29 kg/m .  Physical Exam                       "

## 2023-01-24 NOTE — PATIENT INSTRUCTIONS
"Keep a journal of your symptoms  Hold off on energy drinks    Consider Pepcid for heartburn  Work on reducing acidic foods/heartburn triggering foods  Reducing stress as able    MINDFULNESS    \"Mindfulness is about being fully awake in our lives. It is about perceiving the exquisite vividness of each moment.\"      - Remy Harman  Mindfulness-Based Stress Reduction (MBSR) is a blend of meditation, body awareness, and yoga:   learning through practice and study how your body handles (and can resolve) stress neurologically.     Mindfulness Resources (all are free):  \"Calm\" parvez- free trial has guided 10min sessions on anxiety, gratitude, sleep, happiness, managing stress, etc.   \"Smiling Minds\" parvez- short guided sessions for children and adults  \"Insight Timer\" parvez- free meditation timer with musical or bell tones, can also stream guided meditations    Free 8 week MBSR program online: [email or web address]/          "

## 2023-01-25 LAB
ATRIAL RATE - MUSE: 71 BPM
DIASTOLIC BLOOD PRESSURE - MUSE: NORMAL MMHG
INTERPRETATION ECG - MUSE: NORMAL
P AXIS - MUSE: 34 DEGREES
PR INTERVAL - MUSE: 160 MS
QRS DURATION - MUSE: 110 MS
QT - MUSE: 370 MS
QTC - MUSE: 402 MS
R AXIS - MUSE: 67 DEGREES
SYSTOLIC BLOOD PRESSURE - MUSE: NORMAL MMHG
T AXIS - MUSE: 45 DEGREES
VENTRICULAR RATE- MUSE: 71 BPM

## 2023-05-20 ENCOUNTER — HEALTH MAINTENANCE LETTER (OUTPATIENT)
Age: 43
End: 2023-05-20

## 2024-05-05 SDOH — HEALTH STABILITY: PHYSICAL HEALTH: ON AVERAGE, HOW MANY DAYS PER WEEK DO YOU ENGAGE IN MODERATE TO STRENUOUS EXERCISE (LIKE A BRISK WALK)?: 5 DAYS

## 2024-05-05 SDOH — HEALTH STABILITY: PHYSICAL HEALTH: ON AVERAGE, HOW MANY MINUTES DO YOU ENGAGE IN EXERCISE AT THIS LEVEL?: 20 MIN

## 2024-05-05 ASSESSMENT — SOCIAL DETERMINANTS OF HEALTH (SDOH): HOW OFTEN DO YOU GET TOGETHER WITH FRIENDS OR RELATIVES?: ONCE A WEEK

## 2024-05-06 ASSESSMENT — ANXIETY QUESTIONNAIRES
1. FEELING NERVOUS, ANXIOUS, OR ON EDGE: SEVERAL DAYS
5. BEING SO RESTLESS THAT IT IS HARD TO SIT STILL: SEVERAL DAYS
3. WORRYING TOO MUCH ABOUT DIFFERENT THINGS: SEVERAL DAYS
4. TROUBLE RELAXING: SEVERAL DAYS
6. BECOMING EASILY ANNOYED OR IRRITABLE: SEVERAL DAYS
GAD7 TOTAL SCORE: 7
7. FEELING AFRAID AS IF SOMETHING AWFUL MIGHT HAPPEN: SEVERAL DAYS
7. FEELING AFRAID AS IF SOMETHING AWFUL MIGHT HAPPEN: SEVERAL DAYS
GAD7 TOTAL SCORE: 7
2. NOT BEING ABLE TO STOP OR CONTROL WORRYING: SEVERAL DAYS
GAD7 TOTAL SCORE: 7
IF YOU CHECKED OFF ANY PROBLEMS ON THIS QUESTIONNAIRE, HOW DIFFICULT HAVE THESE PROBLEMS MADE IT FOR YOU TO DO YOUR WORK, TAKE CARE OF THINGS AT HOME, OR GET ALONG WITH OTHER PEOPLE: SOMEWHAT DIFFICULT
8. IF YOU CHECKED OFF ANY PROBLEMS, HOW DIFFICULT HAVE THESE MADE IT FOR YOU TO DO YOUR WORK, TAKE CARE OF THINGS AT HOME, OR GET ALONG WITH OTHER PEOPLE?: SOMEWHAT DIFFICULT

## 2024-05-06 ASSESSMENT — PATIENT HEALTH QUESTIONNAIRE - PHQ9
10. IF YOU CHECKED OFF ANY PROBLEMS, HOW DIFFICULT HAVE THESE PROBLEMS MADE IT FOR YOU TO DO YOUR WORK, TAKE CARE OF THINGS AT HOME, OR GET ALONG WITH OTHER PEOPLE: SOMEWHAT DIFFICULT
SUM OF ALL RESPONSES TO PHQ QUESTIONS 1-9: 2
SUM OF ALL RESPONSES TO PHQ QUESTIONS 1-9: 2

## 2024-05-07 ENCOUNTER — OFFICE VISIT (OUTPATIENT)
Dept: FAMILY MEDICINE | Facility: CLINIC | Age: 44
End: 2024-05-07
Payer: COMMERCIAL

## 2024-05-07 VITALS
RESPIRATION RATE: 20 BRPM | HEART RATE: 88 BPM | DIASTOLIC BLOOD PRESSURE: 80 MMHG | HEIGHT: 69 IN | OXYGEN SATURATION: 97 % | BODY MASS INDEX: 28.22 KG/M2 | TEMPERATURE: 98.1 F | SYSTOLIC BLOOD PRESSURE: 138 MMHG | WEIGHT: 190.5 LBS

## 2024-05-07 DIAGNOSIS — F41.9 ANXIETY: ICD-10-CM

## 2024-05-07 DIAGNOSIS — E78.5 HYPERLIPIDEMIA LDL GOAL <100: ICD-10-CM

## 2024-05-07 DIAGNOSIS — Z13.228 SCREENING FOR METABOLIC DISORDER: ICD-10-CM

## 2024-05-07 DIAGNOSIS — R07.9 CHEST PAIN, UNSPECIFIED TYPE: ICD-10-CM

## 2024-05-07 DIAGNOSIS — Z00.00 ROUTINE HISTORY AND PHYSICAL EXAMINATION OF ADULT: Primary | ICD-10-CM

## 2024-05-07 DIAGNOSIS — Z12.11 SCREEN FOR COLON CANCER: ICD-10-CM

## 2024-05-07 DIAGNOSIS — Z13.1 SCREENING FOR DIABETES MELLITUS: ICD-10-CM

## 2024-05-07 DIAGNOSIS — K21.9 GASTROESOPHAGEAL REFLUX DISEASE WITHOUT ESOPHAGITIS: ICD-10-CM

## 2024-05-07 LAB
ERYTHROCYTE [DISTWIDTH] IN BLOOD BY AUTOMATED COUNT: 11.8 % (ref 10–15)
HBA1C MFR BLD: 5.4 % (ref 0–5.6)
HCT VFR BLD AUTO: 40.5 % (ref 40–53)
HGB BLD-MCNC: 14.3 G/DL (ref 13.3–17.7)
MCH RBC QN AUTO: 32.8 PG (ref 26.5–33)
MCHC RBC AUTO-ENTMCNC: 35.3 G/DL (ref 31.5–36.5)
MCV RBC AUTO: 93 FL (ref 78–100)
PLATELET # BLD AUTO: 306 10E3/UL (ref 150–450)
RBC # BLD AUTO: 4.36 10E6/UL (ref 4.4–5.9)
WBC # BLD AUTO: 7.7 10E3/UL (ref 4–11)

## 2024-05-07 PROCEDURE — 36415 COLL VENOUS BLD VENIPUNCTURE: CPT

## 2024-05-07 PROCEDURE — 99214 OFFICE O/P EST MOD 30 MIN: CPT | Mod: 25

## 2024-05-07 PROCEDURE — 85027 COMPLETE CBC AUTOMATED: CPT

## 2024-05-07 PROCEDURE — 83036 HEMOGLOBIN GLYCOSYLATED A1C: CPT

## 2024-05-07 PROCEDURE — 80061 LIPID PANEL: CPT

## 2024-05-07 PROCEDURE — 80053 COMPREHEN METABOLIC PANEL: CPT

## 2024-05-07 PROCEDURE — 84443 ASSAY THYROID STIM HORMONE: CPT

## 2024-05-07 PROCEDURE — 99396 PREV VISIT EST AGE 40-64: CPT

## 2024-05-07 PROCEDURE — 83721 ASSAY OF BLOOD LIPOPROTEIN: CPT | Mod: 59

## 2024-05-07 RX ORDER — HYDROXYZINE HYDROCHLORIDE 25 MG/1
25 TABLET, FILM COATED ORAL 3 TIMES DAILY PRN
Qty: 30 TABLET | Refills: 1 | Status: SHIPPED | OUTPATIENT
Start: 2024-05-07

## 2024-05-07 ASSESSMENT — PAIN SCALES - GENERAL: PAINLEVEL: NO PAIN (0)

## 2024-05-07 NOTE — PROGRESS NOTES
Preventive Care Visit  [facility]  HONEY Medley CNP, Family Medicine  May 7, 2024      Assessment & Plan     Routine history and physical examination of adult  Screening labs today per maintenance, age, risk assessment, and to promote patient wellbeing.  - REVIEW OF HEALTH MAINTENANCE PROTOCOL ORDERS  - CBC with platelets    Screen for colon cancer  No personal or family history of colon cancer.  - Colonoscopy Screening  Referral; Future    Screening for diabetes mellitus  2023 hemoglobin A1c 5.5.  Managed with lifestyle modifications.  - Hemoglobin A1c    Screening for metabolic disorder  - Comprehensive metabolic panel    Anxiety  May take 25 mg hydroxyzine as needed for anxiety management.  Recommended patient to follow-up with primary care provider for symptom review and medication management.  - hydrOXYzine HCl (ATARAX) 25 MG tablet; Take 1 tablet (25 mg) by mouth 3 times daily as needed for anxiety  - TSH with free T4 reflex    Hyperlipidemia LDL goal <100  2022 .  Managed with lifestyle modifications.  ASCVD score using cholesterol levels from 2022 was 12.1%.  Will draw lipid panel today.  Discussed with patient it is most likely that he will need to start statin lowering medication as he is classified as intermittent ASCVD risk.  - Lipid panel reflex to direct LDL Non-fasting    Gastroesophageal reflux disease without esophagitis  Patient may be possibly experiencing acid reflux.  Will trial 20 mg omeprazole for 2 months.  Take daily in the morning.  Recommended patient to follow-up with primary care provider for symptom review and medication management.  - omeprazole (PRILOSEC) 20 MG DR capsule; Take 1 capsule (20 mg) by mouth daily    Left-sided chest pain  No personal cardiac history.  No abnormalities noted on cardiovascular exam.  Intermittent left-sided chest pain may be related to anxiety, reflux, nicotine use, energy drink consumption.  Will trial PPI.   "Prescribed as needed anxiety medication.  Will rule out thyroid disorder.  Continue to eliminate nicotine use and energy drinks.  Try to obtain 6 to 8 hours of sleep at night.  If left-sided chest pain continues, will will look into cardiology etiology.    Nicotine/Tobacco Cessation  He reports that he has been smoking cigarettes. He has a 22 pack-year smoking history. He has never used smokeless tobacco.  Nicotine/Tobacco Cessation Plan  Information offered: Patient not interested at this time    BMI  Estimated body mass index is 28.38 kg/m  as calculated from the following:    Height as of this encounter: 1.745 m (5' 8.7\").    Weight as of this encounter: 86.4 kg (190 lb 8 oz).   Weight management plan: Discussed healthy diet and exercise guidelines    Counseling  Appropriate preventive services were discussed with this patient, including applicable screening as appropriate for fall prevention, nutrition, physical activity, Tobacco-use cessation, weight loss and cognition.  Checklist reviewing preventive services available has been given to the patient.  Reviewed patient's diet, addressing concerns and/or questions.   The patient was instructed to see the dentist every 6 months.     Darren Ruelas is a 43 year old, presenting for the following:  Physical        5/7/2024     3:15 PM   Additional Questions   Roomed by Caren BARTLETT CMA     Health Care Directive  Patient does not have a Health Care Directive or Living Will: Discussed advance care planning with patient; however, patient declined at this time.    HPI  Patient is a 43-year-old male who presents for annual physical exam.  Medical history includes hyperlipidemia, depression, ADHD, anxiety, tobacco use, history of drug and alcohol abuse.  30 pack history.  Stopped smoking two days ago.  Started vaping in place of smoking.  Previously drank 2-6 energy drinks daily for 5 years.  Stopped drinking energy drinks two days ago.  Sober from alcohol 8 years ago.  " Previous history of drug (methamphetamine and cocaine) and alcohol abuse.  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  Will sleep 5-7 hours nightly.     Reports intermittent left sided chest pain for the past couple of years.  Occasionally will wake up and feel generalized chest tenderness.  Occasionally will experience self limiting palpitations, dyspnea, and lightheadedness.  Does occasionally experience acid reflux after eating.  Reports paresthesia of extermities with prolonged pressure such as laying on left arm when sleeping at night.  Does report longstanding history of anxiety and depression since adolescence.  Has been on many antidepressants prior.  Currently not on any medications for management of anxiety or depression.  At this time does not want to restart any anxiety or depression medication.  States he occasionally experiences anxiety couple times a week.  Believes anxiety is related to symptoms of left-sided chest pain, excessive work hours, nicotine use, energy drink consumption.  No personal history CAD, arrhythmias, CHF.  Patient mother has hypothyroidism.          5/5/2024   General Health   How would you rate your overall physical health? (!) FAIR   Feel stress (tense, anxious, or unable to sleep) To some extent   (!) STRESS CONCERN      5/5/2024   Nutrition   Three or more servings of calcium each day? (!) NO   Diet: Regular (no restrictions)   How many servings of fruit and vegetables per day? (!) 2-3   How many sweetened beverages each day? (!) 4+         5/5/2024   Exercise   Days per week of moderate/strenous exercise 5 days   Average minutes spent exercising at this level 20 min         5/5/2024   Social Factors   Frequency of gathering with friends or relatives Once a week   Worry food won't last until get money to buy more No   Food not last or not have enough money for food? No   Do you have housing?  Yes   Are you worried about losing  your housing? No   Lack of transportation? No   Unable to get utilities (heat,electricity)? No         5/5/2024   Dental   Dentist two times every year? (!) NO         5/5/2024   TB Screening   Were you born outside of the [place]? No     Today's PHQ-9 Score:       5/6/2024     6:10 PM   PHQ-9 SCORE   PHQ-9 Total Score MyChart 2 (Minimal depression)   PHQ-9 Total Score 2         5/5/2024   Substance Use   Alcohol more than 3/day or more than 7/wk Not Applicable   Do you use any other substances recreationally? No     Social History     Tobacco Use    Smoking status: Every Day     Current packs/day: 1.00     Average packs/day: 1 pack/day for 22.0 years (22.0 ttl pk-yrs)     Types: Cigarettes    Smokeless tobacco: Never    Tobacco comments:     Need to quit.   Vaping Use    Vaping status: Every Day    Substances: Nicotine    Devices: Disposable   Substance Use Topics    Alcohol use: Not Currently     Alcohol/week: 4.2 standard drinks of alcohol     Comment: Over 4 years sober.    Drug use: No           5/5/2024   STI Screening   New sexual partner(s) since last STI/HIV test? No   ASCVD Risk   The 10-year ASCVD risk score (Tamiko MENDEZ, et al., 2019) is: 12.1%    Values used to calculate the score:      Age: 43 years      Sex: Male      Is Non- : No      Diabetic: No      Tobacco smoker: Yes      Systolic Blood Pressure: 138 mmHg      Is BP treated: No      HDL Cholesterol: 28 mg/dL      Total Cholesterol: 197 mg/dL        5/5/2024   Contraception/Family Planning   Questions about contraception or family planning No        Reviewed and updated as needed this visit by Provider    Past Medical History:   Diagnosis Date    Depressive disorder age 12     Past Surgical History:   Procedure Laterality Date    HEAD & NECK SURGERY  age 14    broken nose, dental surgeries     BP Readings from Last 3 Encounters:   05/07/24 138/80   01/24/23 108/78   01/24/22 112/72    Wt Readings from Last 3 Encounters:  "  05/07/24 86.4 kg (190 lb 8 oz)   01/24/23 80.7 kg (178 lb)   01/24/22 77 kg (169 lb 12.8 oz)         No current outpatient medications on file.     Review of Systems  Review of systems negative otherwise known HPI.     Objective    Exam  /80 (BP Location: Right arm, Patient Position: Sitting, Cuff Size: Adult Regular)   Pulse 88   Temp 98.1  F (36.7  C) (Temporal)   Resp 20   Ht 1.745 m (5' 8.7\")   Wt 86.4 kg (190 lb 8 oz)   SpO2 97%   BMI 28.38 kg/m     Estimated body mass index is 28.38 kg/m  as calculated from the following:    Height as of this encounter: 1.745 m (5' 8.7\").    Weight as of this encounter: 86.4 kg (190 lb 8 oz).    Physical Exam  General: Alert, oriented, no acute distress.    Eyes: Normal external eye, conjunctiva, and lids. JIN.   Ears: External ear nontender. Normal landmark and color.    Oropharynx: Dentition intact. Oral and posterior pharynx pink and moist.     Neck: Supple, no masses or nodes. No adenopathy.    Respiratory: Lungs clear, unlabored. No rales, rhonchi, or wheezes.    Cardiovascular: Regular rate and rhythm, S1 and S2. No murmurs. No peripheral edema.     Gastrointestinal: Normoactive bowel sounds. Soft, nontender, no organomegaly.     Musculoskeletal: No joint tenderness, deformity, or swelling.     Skin: No suspicious lesions, rashes, or abnormalities.    Neurologic: No gross motor or sensory deficit. Mentation intact and speech normal.     Psychiatric: Appropriate affect.   Genitourinary: Deferred, no concerns.  Rectal Exam: Deferred, no concerns.    Signed Electronically by: HONEY Medley CNP    "

## 2024-05-08 DIAGNOSIS — E78.5 HYPERLIPIDEMIA LDL GOAL <100: Primary | ICD-10-CM

## 2024-05-08 LAB
ALBUMIN SERPL BCG-MCNC: 4.4 G/DL (ref 3.5–5.2)
ALP SERPL-CCNC: 93 U/L (ref 40–150)
ALT SERPL W P-5'-P-CCNC: 34 U/L (ref 0–70)
ANION GAP SERPL CALCULATED.3IONS-SCNC: 11 MMOL/L (ref 7–15)
AST SERPL W P-5'-P-CCNC: 28 U/L (ref 0–45)
BILIRUB SERPL-MCNC: 0.4 MG/DL
BUN SERPL-MCNC: 17.2 MG/DL (ref 6–20)
CALCIUM SERPL-MCNC: 9.2 MG/DL (ref 8.6–10)
CHLORIDE SERPL-SCNC: 103 MMOL/L (ref 98–107)
CHOLEST SERPL-MCNC: 250 MG/DL
CREAT SERPL-MCNC: 0.89 MG/DL (ref 0.67–1.17)
DEPRECATED HCO3 PLAS-SCNC: 23 MMOL/L (ref 22–29)
EGFRCR SERPLBLD CKD-EPI 2021: >90 ML/MIN/1.73M2
FASTING STATUS PATIENT QL REPORTED: ABNORMAL
FASTING STATUS PATIENT QL REPORTED: NORMAL
GLUCOSE SERPL-MCNC: 85 MG/DL (ref 70–99)
HDLC SERPL-MCNC: 31 MG/DL
LDLC SERPL CALC-MCNC: ABNORMAL MG/DL
LDLC SERPL DIRECT ASSAY-MCNC: 163 MG/DL
NONHDLC SERPL-MCNC: 219 MG/DL
POTASSIUM SERPL-SCNC: 4.1 MMOL/L (ref 3.4–5.3)
PROT SERPL-MCNC: 6.9 G/DL (ref 6.4–8.3)
SODIUM SERPL-SCNC: 137 MMOL/L (ref 135–145)
TRIGL SERPL-MCNC: 422 MG/DL
TSH SERPL DL<=0.005 MIU/L-ACNC: 2.58 UIU/ML (ref 0.3–4.2)

## 2024-05-08 RX ORDER — ATORVASTATIN CALCIUM 40 MG/1
40 TABLET, FILM COATED ORAL DAILY
Qty: 90 TABLET | Refills: 0 | Status: SHIPPED | OUTPATIENT
Start: 2024-05-08 | End: 2024-08-02

## 2024-05-09 NOTE — RESULT ENCOUNTER NOTE
LDL (bad cholesterol) is elevated at 165, goal is LDL <100.  Ages 40 to 75 years old, ASCVD (atherosclerotic cardiovascular disease) goal is <5%.  Currently you are at 15.7% (classified as intermediate risk) which puts you at risk for a cardiovascular event such as heart attack or stroke.  The American College of Cardiology and American Heart Association recommend at intermittent risk starting a statin- cholesterol-lowering medication.  I would like to start you on 40 mg atorvastatin daily for reduction of cholesterol.  I will send the medication to preferred pharmacy.  Take medication daily in the evening or before bedtime.  Watch for muscle aches or dark urine, notify me immediately if this happens.     Lifestyle modification medications are very important also, heart healthy diet with reduction of high fatty foods, processed foods, salt intake and incorporation of fruits and vegetables, regular moderate physical exercise, preventing type 2 diabetes, preventing chronic kidney disease, control of hypertension with goal <130/80 (continue to take your hypertensive medications as prescribed), reduction of alcohol, cessation of tobacco smoking.     I want to see you back in 3 months to recheck lipids and medication management.

## 2024-06-09 ENCOUNTER — MYC REFILL (OUTPATIENT)
Dept: FAMILY MEDICINE | Facility: CLINIC | Age: 44
End: 2024-06-09
Payer: COMMERCIAL

## 2024-06-09 DIAGNOSIS — E78.5 HYPERLIPIDEMIA LDL GOAL <100: ICD-10-CM

## 2024-06-10 RX ORDER — ATORVASTATIN CALCIUM 40 MG/1
40 TABLET, FILM COATED ORAL DAILY
Qty: 90 TABLET | Refills: 0 | OUTPATIENT
Start: 2024-06-10

## 2024-07-31 ENCOUNTER — TELEPHONE (OUTPATIENT)
Dept: GASTROENTEROLOGY | Facility: CLINIC | Age: 44
End: 2024-07-31
Payer: COMMERCIAL

## 2024-07-31 NOTE — TELEPHONE ENCOUNTER
"Endoscopy Scheduling Screen    Have you had a positive Covid test in the last 14 days?  No    What is your communication preference for Instructions and/or Bowel Prep?   MyChart    What insurance is in the chart?  Other:  [employer]    Ordering/Referring Provider: EVAN BENITEZ    (If ordering provider performs procedure, schedule with ordering provider unless otherwise instructed. )    BMI: Estimated body mass index is 28.38 kg/m  as calculated from the following:    Height as of 5/7/24: 1.745 m (5' 8.7\").    Weight as of 5/7/24: 86.4 kg (190 lb 8 oz).     Sedation Ordered  moderate sedation.   If patient BMI > 50 do not schedule in [facility].    If patient BMI > 45 do not schedule at [facility].    Are you taking methadone or Suboxone?  No    Have you had difficulties, pain, or discomfort during past endoscopy procedures?  No    Are you taking any prescription medications for pain 3 or more times per week?   NO, No RN review required.    Do you have a history of malignant hyperthermia?  No       Have you been diagnosed or told you have pulmonary hypertension?   No    Do you have an LVAD?  No    Have you been told you have moderate to severe sleep apnea?  No    Have you been told you have COPD, asthma, or any other lung disease?  No    Do you have any heart conditions?  No     Have you ever had or are you waiting for an organ transplant?  No. Continue scheduling, no site restrictions.    Have you had a stroke or transient ischemic attack (TIA aka \"mini stroke\" in the last 6 months?   No    Have you been diagnosed with or been told you have cirrhosis of the liver?   No    Are you currently on dialysis?   No    Do you need assistance transferring?   No    BMI: Estimated body mass index is 28.38 kg/m  as calculated from the following:    Height as of 5/7/24: 1.745 m (5' 8.7\").    Weight as of 5/7/24: 86.4 kg (190 lb 8 oz).     Is patients BMI > 40 and scheduling location [facility]?  No    Do you take an injectable medication for weight loss " or diabetes (excluding insulin)?  No    Do you take the medication Naltrexone?  No    Do you take blood thinners?  Yes Lipitor    Are you taking Effient/Prasugrel?  No, you must contact your prescribing provider for direction on holding or bridging with a different medication.       Prep   Are you currently on dialysis or do you have chronic kidney disease?  No    Do you have a diagnosis of diabetes?  No    Do you have a diagnosis of cystic fibrosis (CF)?  No    On a regular basis do you go 3 -5 days between bowel movements?  No    BMI > 40?  No    Preferred Pharmacy:      [id number] - [place], [place] - [address] AT [address]  [address]  [place] [address]  Phone: [phone] Fax: [phone]        Final Scheduling Details     Procedure scheduled  Colonoscopy    Surgeon:  Dixon     Date of procedure:  10/10/24     Pre-OP / PAC:   No - Not required for this site.    Location  [facility] - Patient preference.    Sedation   MAC/Deep Sedation  Per location      Patient Reminders:   You will receive a call from a Nurse to review instructions and health history.  This assessment must be completed prior to your procedure.  Failure to complete the Nurse assessment may result in the procedure being cancelled.      On the day of your procedure, please designate an adult(s) who can drive you home stay with you for the next 24 hours. The medicines used in the exam will make you sleepy. You will not be able to drive.      You cannot take public transportation, ride share services, or non-medical taxi service without a responsible caregiver.  Medical transport services are allowed with the requirement that a responsible caregiver will receive you at your destination.  We require that drivers and caregivers are confirmed prior to your procedure.

## 2024-08-02 ENCOUNTER — OFFICE VISIT (OUTPATIENT)
Dept: FAMILY MEDICINE | Facility: CLINIC | Age: 44
End: 2024-08-02
Payer: COMMERCIAL

## 2024-08-02 VITALS
OXYGEN SATURATION: 98 % | BODY MASS INDEX: 27.86 KG/M2 | HEART RATE: 76 BPM | SYSTOLIC BLOOD PRESSURE: 120 MMHG | DIASTOLIC BLOOD PRESSURE: 80 MMHG | RESPIRATION RATE: 20 BRPM | WEIGHT: 194.6 LBS | HEIGHT: 70 IN | TEMPERATURE: 98.1 F

## 2024-08-02 DIAGNOSIS — Z87.891 CESSATION OF TOBACCO USE IN PREVIOUS 12 MONTHS: ICD-10-CM

## 2024-08-02 DIAGNOSIS — E78.5 HYPERLIPIDEMIA LDL GOAL <100: Primary | ICD-10-CM

## 2024-08-02 LAB
CHOLEST SERPL-MCNC: 137 MG/DL
FASTING STATUS PATIENT QL REPORTED: ABNORMAL
HDLC SERPL-MCNC: 38 MG/DL
LDLC SERPL CALC-MCNC: 64 MG/DL
NONHDLC SERPL-MCNC: 99 MG/DL
TRIGL SERPL-MCNC: 175 MG/DL

## 2024-08-02 PROCEDURE — 36415 COLL VENOUS BLD VENIPUNCTURE: CPT

## 2024-08-02 PROCEDURE — 80061 LIPID PANEL: CPT

## 2024-08-02 PROCEDURE — 99213 OFFICE O/P EST LOW 20 MIN: CPT

## 2024-08-02 ASSESSMENT — PAIN SCALES - GENERAL: PAINLEVEL: NO PAIN (0)

## 2024-08-02 NOTE — PROGRESS NOTES
Assessment & Plan     Hyperlipidemia LDL goal <100  5/7/2024  and ASCVD score 15.7%.  Started patient on atorvastatin 40 mg.  Patient may have possible side effects while taking medication.  Will discontinue use at this time.  Patient has been working on lifestyle modifications, great job.  Return back in 3 months for fasting lipid panel and we will reevaluate at that time if medication management is needed.  - Lipid panel reflex to direct LDL Fasting - Released 8/2/2024 7:06 AM     Darren Ruelas is a 43 year old, presenting for the following health issues:  Follow Up (Med and lab F/U)      8/2/2024     6:46 AM   Additional Questions   Roomed by Margo Encarnacion   Accompanied by Self     HPI     Patient is a 43-year-old male who presents for lipid panel recheck and medication management.  Medical history includes hyperlipidemia, depression, ADHD, anxiety, history of tobacco use, history of drug and alcohol abuse.  30 pack history.  Stopped smoking 3 months ago, 5/5/2024.  Previously drank 2-6 energy drinks daily for 5 years.  Stopped drinking energy drinks three months ago.  Sober from alcohol 8 years ago.  Previous history of drug (methamphetamine and cocaine) and alcohol abuse.      5/7/2024  and ASCVD score 15.7%.  Started patient on atorvastatin 40 mg daily for cholesterol reduction.  Patient states that he would experience symptoms of fatigue and feeling rundown if he missed a dose of atorvastatin.  Did not notice symptoms if he continued with atorvastatin daily without missing a dose.  Unsure if this is a side effect of medication.  Denies myalgias.  He is currently not taking other medications at this time.  Has been trying hard to incorporate healthy lifestyle modifications.  Has been eating more vegetables and fruits, cutting out sugars.  Quit smoking cigarettes 3 months ago.  Had noticed some withdrawal symptoms which have resolved.  Continues to have cravings but has been able to  overcome them.  Has strong social support as both of his brothers quit smoking cold turkey.  Congratulated patient on smoking cessation.  Patient continues to abstain from drinking energy drinks.  Patient reported left-sided chest pain at previous annual visit.  Patient states since smoking cessation, limited energy drinks, incorporating healthy diet- left sided chest pain with left arm paresthesia have spontaneous resolved.    Review of Systems  Review of systems negative otherwise known HPI.    Past Medical History:   Diagnosis Date    Depressive disorder age 12     Past Surgical History:   Procedure Laterality Date    HEAD & NECK SURGERY  age 14    broken nose, dental surgeries     Family History   Problem Relation Age of Onset    Chronic Obstructive Pulmonary Disease Father     Heart Disease Father     Hypertension Father     Mental Illness Father     Substance Abuse Father     Chronic Obstructive Pulmonary Disease Paternal Grandmother     Brain Cancer Paternal Grandfather     Diabetes Paternal Grandfather     Hypertension Paternal Grandfather     Depression Paternal Grandfather     Substance Abuse Paternal Grandfather     Other Cancer Mother         Small cell lung cancer    Hypertension Mother     Cancer Mother     Depression Mother     Thyroid Disease Mother     Hypertension Sister      Social History     Tobacco Use    Smoking status: Every Day     Current packs/day: 1.00     Average packs/day: 1 pack/day for 22.0 years (22.0 ttl pk-yrs)     Types: Cigarettes    Smokeless tobacco: Never    Tobacco comments:     Need to quit.   Substance Use Topics    Alcohol use: Not Currently     Alcohol/week: 4.2 standard drinks of alcohol     Comment: Over 4 years sober.     Current Outpatient Medications   Medication Sig Dispense Refill    hydrOXYzine HCl (ATARAX) 25 MG tablet Take 1 tablet (25 mg) by mouth 3 times daily as needed for anxiety 30 tablet 1     No current facility-administered medications for this visit.     "   Objective    /80   Pulse 76   Temp 98.1  F (36.7  C) (Temporal)   Resp 20   Ht 1.778 m (5' 10\")   Wt 88.3 kg (194 lb 9.6 oz)   SpO2 98%   BMI 27.92 kg/m    Body mass index is 27.92 kg/m .  Physical Exam   General: Alert, oriented, no acute distress.    Head: Normocephalic and atraumatic.   Eyes: Conjunctiva and sclera clear.   Respiratory: Lungs clear, unlabored. No rales, rhonchi, or wheezes.    Cardiovascular: Regular rate and rhythm, S1 and S2. No murmurs. No peripheral edema.     Neurologic: No gross motor or sensory deficit. Mentation intact and speech normal.     Psychiatric: Appropriate affect.     Signed Electronically by: HONEY Medley CNP    "

## 2024-09-24 ENCOUNTER — PATIENT OUTREACH (OUTPATIENT)
Dept: CARE COORDINATION | Facility: CLINIC | Age: 44
End: 2024-09-24
Payer: COMMERCIAL

## 2024-10-09 ENCOUNTER — ANESTHESIA EVENT (OUTPATIENT)
Dept: SURGERY | Facility: AMBULATORY SURGERY CENTER | Age: 44
End: 2024-10-09
Payer: COMMERCIAL

## 2024-10-10 ENCOUNTER — ANESTHESIA (OUTPATIENT)
Dept: SURGERY | Facility: AMBULATORY SURGERY CENTER | Age: 44
End: 2024-10-10
Payer: COMMERCIAL

## 2024-10-10 ENCOUNTER — HOSPITAL ENCOUNTER (OUTPATIENT)
Facility: AMBULATORY SURGERY CENTER | Age: 44
Discharge: HOME | End: 2024-10-10
Attending: FAMILY MEDICINE
Payer: COMMERCIAL

## 2024-10-10 VITALS
SYSTOLIC BLOOD PRESSURE: 116 MMHG | DIASTOLIC BLOOD PRESSURE: 77 MMHG | TEMPERATURE: 97.6 F | HEART RATE: 75 BPM | HEIGHT: 70 IN | OXYGEN SATURATION: 99 % | WEIGHT: 194 LBS | RESPIRATION RATE: 16 BRPM | BODY MASS INDEX: 27.77 KG/M2

## 2024-10-10 DIAGNOSIS — Z12.11 SCREEN FOR COLON CANCER: ICD-10-CM

## 2024-10-10 LAB — COLONOSCOPY: NORMAL

## 2024-10-10 PROCEDURE — 45385 COLONOSCOPY W/LESION REMOVAL: CPT | Performed by: FAMILY MEDICINE

## 2024-10-10 RX ORDER — LIDOCAINE 40 MG/G
CREAM TOPICAL
Status: DISCONTINUED | OUTPATIENT
Start: 2024-10-10 | End: 2024-10-11 | Stop reason: HOSPADM

## 2024-10-10 RX ORDER — PROPOFOL 10 MG/ML
INJECTION, EMULSION INTRAVENOUS CONTINUOUS PRN
Status: DISCONTINUED | OUTPATIENT
Start: 2024-10-10 | End: 2024-10-10

## 2024-10-10 RX ORDER — DEXAMETHASONE SODIUM PHOSPHATE 4 MG/ML
4 INJECTION, SOLUTION INTRA-ARTICULAR; INTRALESIONAL; INTRAMUSCULAR; INTRAVENOUS; SOFT TISSUE
Status: DISCONTINUED | OUTPATIENT
Start: 2024-10-10 | End: 2024-10-11 | Stop reason: HOSPADM

## 2024-10-10 RX ORDER — ONDANSETRON 4 MG/1
4 TABLET, ORALLY DISINTEGRATING ORAL EVERY 30 MIN PRN
Status: DISCONTINUED | OUTPATIENT
Start: 2024-10-10 | End: 2024-10-11 | Stop reason: HOSPADM

## 2024-10-10 RX ORDER — ACETAMINOPHEN 325 MG/1
975 TABLET ORAL ONCE
Status: DISCONTINUED | OUTPATIENT
Start: 2024-10-10 | End: 2024-10-11 | Stop reason: HOSPADM

## 2024-10-10 RX ORDER — ONDANSETRON 2 MG/ML
4 INJECTION INTRAMUSCULAR; INTRAVENOUS EVERY 30 MIN PRN
Status: DISCONTINUED | OUTPATIENT
Start: 2024-10-10 | End: 2024-10-11 | Stop reason: HOSPADM

## 2024-10-10 RX ORDER — OXYCODONE HCL 5 MG/5 ML
5 SOLUTION, ORAL ORAL
Status: DISCONTINUED | OUTPATIENT
Start: 2024-10-10 | End: 2024-10-11 | Stop reason: HOSPADM

## 2024-10-10 RX ORDER — NALOXONE HYDROCHLORIDE 0.4 MG/ML
0.1 INJECTION, SOLUTION INTRAMUSCULAR; INTRAVENOUS; SUBCUTANEOUS
Status: DISCONTINUED | OUTPATIENT
Start: 2024-10-10 | End: 2024-10-11 | Stop reason: HOSPADM

## 2024-10-10 RX ORDER — OXYCODONE HCL 5 MG/5 ML
10 SOLUTION, ORAL ORAL
Status: DISCONTINUED | OUTPATIENT
Start: 2024-10-10 | End: 2024-10-11 | Stop reason: HOSPADM

## 2024-10-10 RX ORDER — FENTANYL CITRATE 0.05 MG/ML
50 INJECTION, SOLUTION INTRAMUSCULAR; INTRAVENOUS
Status: DISCONTINUED | OUTPATIENT
Start: 2024-10-10 | End: 2024-10-11 | Stop reason: HOSPADM

## 2024-10-10 RX ADMIN — PROPOFOL 160 MCG/KG/MIN: 10 INJECTION, EMULSION INTRAVENOUS at 12:36

## 2024-10-10 NOTE — DISCHARGE INSTRUCTIONS
Colonoscopy Discharge Instructions  When You Leave Today:  The medications you received today may affect your short-term memory, balance/coordination, and reflexes. It may cause drowsiness, slow reflexes, and impaired thoughts. We recommend that you go home and rest for the remainder of the day. Do NOT DRIVE, go back to work or do anything that requires a clear thought process. Examples include but are not limited to: Do NOT drive, operate any machines, make important personal or work decisions, or sign legal documentation for 24 hours.      You may feel bloated because of the air that was introduced during the exam. Flatulence and belching is expected. If you feel like the air isn't coming out easily you can try laying on your right side to help the air move in the correct direction. Holding in the air can cause abdominal pain, cramping, and in some cases nausea. Let the gas pass!!    You may eat and drink what you can tolerate after your exam.  We recommend that you start with something light and progress as tolerated. You will be gassy, so avoiding things that create more gas is beneficial. This includes things like beans, carbonated beverages, and whole vegetables like broccoli/brussel sprouts/cabbage.     AVOID alcohol for 24 hours. This could have an adverse reaction mixed with the sedative.       You may resume your normal home medications unless told otherwise. Do not double up on any missed doses.        Avoid Aspirin and Aspirin containing products for 3 days if biopsies were taken. This will allow the areas to heal and will decrease your risk of bleeding.      Watch the area where your IV was inserted. If there is any swelling, severe pain or the area feels hot, place a warm, wet cloth over the area for 10-20 minutes. A small bruise is normal. If you continue to have discomfort, contact your doctor.      If you use a CPAP at home, please use it for the next 24-48 hours.    If any of the following  occur, please go to your CLOSEST EMERGENCY ROOM.    Increasing abdominal pain (if you are unable to pass gas or doubled over in pain)  Fever of 101.5 degrees or higher  Bleeding (a small amount of bleeding is normal when wiping if you have hemorrhoids or we remove tissue samples. However if you are filling the toilet with bright red blood, this is an emergency)    Call the [facility] [phone] if you have any questions or concerns regarding your procedure.  The [facility] is open from 8am to 5pm Monday through Friday.  DO not come to the clinic for severe post procedure complications, go to your closest ER.     Colonoscopy:    X Biopsies Taken    No Polyps- Follow up in 10 years (unless condition changes or treatment guidelines change)    Diverticulosis   X Hemorrhoids X Internal X External   X High Fiber Diet           Other _______________________________________________________________    If biopsies were taken:  A letter will be mailed to you with your biopsy results and further recommendations in approximately   1-2 weeks.  If you have not received a letter within 3 weeks, please call the [facility] [phone].

## 2024-10-10 NOTE — ANESTHESIA CARE TRANSFER NOTE
Patient: Stevie Mcnally    Procedure: Procedure(s):  COLONOSCOPY, POLYPECTOMY       Diagnosis: Screen for colon cancer [Z12.11]  Diagnosis Additional Information: No value filed.    Anesthesia Type:   MAC     Note:    Oropharynx: oropharynx clear of all foreign objects  Level of Consciousness: drowsy  Oxygen Supplementation: face mask  Level of Supplemental Oxygen (L/min / FiO2): 6  Independent Airway: airway patency satisfactory and stable  Dentition: dentition unchanged  Vital Signs Stable: post-procedure vital signs reviewed and stable  Report to RN Given: handoff report given  Patient transferred to: Phase II    Handoff Report: Identifed the Patient, Identified the Reponsible Provider, Reviewed the pertinent medical history, Discussed the surgical course, Reviewed Intra-OP anesthesia mangement and issues during anesthesia, Set expectations for post-procedure period and Allowed opportunity for questions and acknowledgement of understanding      Vitals:  Vitals Value Taken Time   /77 10/10/24 1308   Temp 97.6  F (36.4  C) 10/10/24 1308   Pulse 75 10/10/24 1308   Resp 16 10/10/24 1308   SpO2 99 % 10/10/24 1308       Electronically Signed By: HONEY Moreno CRNA  October 10, 2024  1:09 PM

## 2024-10-10 NOTE — ANESTHESIA POSTPROCEDURE EVALUATION
Patient: Stevie Mcnally    Procedure: Procedure(s):  COLONOSCOPY, POLYPECTOMY       Anesthesia Type:  MAC    Note:  Disposition: Outpatient   Postop Pain Control: Uneventful            Sign Out: Well controlled pain   PONV: No   Neuro/Psych: Uneventful            Sign Out: Acceptable/Baseline neuro status   Airway/Respiratory: Uneventful            Sign Out: Acceptable/Baseline resp. status   CV/Hemodynamics: Uneventful            Sign Out: Acceptable CV status; No obvious hypovolemia; No obvious fluid overload   Other NRE: NONE   DID A NON-ROUTINE EVENT OCCUR? No           Last vitals:  Vitals Value Taken Time   /88 10/10/24 1332   Temp 97.6  F (36.4  C) 10/10/24 1308   Pulse 73 10/10/24 1344   Resp 16 10/10/24 1308   SpO2 97 % 10/10/24 1344   Vitals shown include unfiled device data.    Electronically Signed By: Hardeep Kim MD  October 10, 2024  4:05 PM

## 2024-10-10 NOTE — ANESTHESIA PREPROCEDURE EVALUATION
Anesthesia Pre-Procedure Evaluation    Patient: Stevie Mcnally   MRN: [id number] : 1980        Procedure : Procedure(s):  COLONOSCOPY          Past Medical History:   Diagnosis Date    Depressive disorder age 12      Past Surgical History:   Procedure Laterality Date    HEAD & NECK SURGERY  age 14    broken nose, dental surgeries      Allergies   Allergen Reactions    Bupropion Palpitations     Heart racing at age 14.  Heart racing at age 14.  Heart racing at age 14.        Social History     Tobacco Use    Smoking status: Former     Current packs/day: 1.00     Average packs/day: 1 pack/day for 22.0 years (22.0 ttl pk-yrs)     Types: Cigarettes    Smokeless tobacco: Never    Tobacco comments:     Need to quit.   Substance Use Topics    Alcohol use: Not Currently     Alcohol/week: 4.2 standard drinks of alcohol     Comment: Over 4 years sober.      Wt Readings from Last 1 Encounters:   10/10/24 88 kg (194 lb)        Anesthesia Evaluation   Pt has had prior anesthetic.         ROS/MED HX  ENT/Pulmonary:       Neurologic:       Cardiovascular:       METS/Exercise Tolerance:     Hematologic:       Musculoskeletal:       GI/Hepatic:       Renal/Genitourinary:       Endo:       Psychiatric/Substance Use:       Infectious Disease:       Malignancy:       Other:            Physical Exam    Airway        Mallampati: II    Neck ROM: full     Respiratory Devices and Support         Dental       (+) Minor Abnormalities - some fillings, tiny chips      Cardiovascular   cardiovascular exam normal          Pulmonary   pulmonary exam normal                OUTSIDE LABS:  CBC:   Lab Results   Component Value Date    WBC 7.7 2024    WBC 11.1 (H) 2023    HGB 14.3 2024    HGB 15.7 2023    HCT 40.5 2024    HCT 45.5 2023     2024     2023     BMP:   Lab Results   Component Value Date     2024     2023    POTASSIUM 4.1 2024    POTASSIUM  "4.6 01/24/2023    CHLORIDE 103 05/07/2024    CHLORIDE 103 01/24/2023    CO2 23 05/07/2024    CO2 25 01/24/2023    BUN 17.2 05/07/2024    BUN 11.3 01/24/2023    CR 0.89 05/07/2024    CR 0.75 01/24/2023    GLC 85 05/07/2024    GLC 88 01/24/2023     COAGS: No results found for: \"PTT\", \"INR\", \"FIBR\"  POC: No results found for: \"BGM\", \"HCG\", \"HCGS\"  HEPATIC:   Lab Results   Component Value Date    ALBUMIN 4.4 05/07/2024    PROTTOTAL 6.9 05/07/2024    ALT 34 05/07/2024    AST 28 05/07/2024    ALKPHOS 93 05/07/2024    BILITOTAL 0.4 05/07/2024     OTHER:   Lab Results   Component Value Date    A1C 5.4 05/07/2024    CURTIS 9.2 05/07/2024    LIPASE 19 01/24/2023    TSH 2.58 05/07/2024       Anesthesia Plan    ASA Status:  1       Anesthesia Type: MAC.              Consents    Anesthesia Plan(s) and associated risks, benefits, and realistic alternatives discussed. Questions answered and patient/representative(s) expressed understanding.     - Discussed: Risks, Benefits and Alternatives for the PROCEDURE were discussed     - Discussed with:  Patient      - Extended Intubation/Ventilatory Support Discussed: No.      - Patient is DNR/DNI Status: No     Use of blood products discussed: No .     Postoperative Care            Comments:               Fatoumata Mcclellan MD    I have reviewed the pertinent notes and labs in the chart from the past 30 days and (re)examined the patient.  Any updates or changes from those notes are reflected in this note.                       # Overweight: Estimated body mass index is 27.84 kg/m  as calculated from the following:    Height as of this encounter: 1.778 m (5' 10\").    Weight as of this encounter: 88 kg (194 lb).             "

## 2024-10-10 NOTE — LETTER
10/17/24      Stevie Mcnally  [address] [place] [address]    : 1980    Stevie Mcnally,    The results from your colonoscopy showed several precancerous polyps. Based on these results, current guidelines recommend repeat exam in 3-5 years for surveillance colonoscopy (in the years 3520-7924).    Thank you coming to see me for your colonoscopy. Please let me know if there is any further information that you need.    Dr. Dixon Metcalf III, MD, FAAFP  Faculty Physician, [facility] Medicine [facility]idency    Department of Family Medicine and Community Health  [employer] [facility] [employer]    Office: [phone]

## 2024-10-11 NOTE — OP NOTE
COLONOSCOPY OPERATION REPORT     OPERATION PERFORMED:  Colonoscopy with MAC  DATE OF OPERATION: 10 October 2024  SURGEON(S): Roldan Metcalf III, MD, FAAFP    Preoperative Diagnosis: Colorectal Cancer Screen  (Z12.11)    Postoperative Diagnosis: Polyps of the Colon, internal hemorrhoids     History:    44yom here for initial screening colonoscopy. Denies symptoms. No family history of colon cancer.        Shortly Before Procedure  Time out was completed. Correct patient ID, procedure verified, positioning verified, implants/equipment available, studies available as needed. Consents signed and on the chart. Emergency resuscitation equipment available if needed. TYPE OF ANESTHESIA:  MAC. Patient monitored in the usual fashion with pulse ox, NIBP, and EKG. See flowsheet for full details.      DESCRIPTION OF OPERATION:  Assuring patient comfort, a rectal exam revealed normal sphincter tone, no masses, no stool in the rectal vault. The video colonoscope was passed from anus to cecum under direct visualization with expected amount of difficulty. The cecum was identified by the ileocecal valve, appendiceal orifice, and crows foot. Mucosa was inspected > 6 minute scope withdrawal.         Findings   Prep:    Excellent   EBL:                     < 5 mL   Terminal Ileum: Normal appearance   Cecum:                     Normal appearance.   Ascending Colon:    Normal appearance.       Transverse Colon:   Two polyps < 1cm completely removed by cold snare.        Desc. Colon: Two polyps < 1cm completely removed by cold snare.       Sigmoid Colon:        Normal appearance.        Rectum:                    Moderate internal hemorrhoids       Complications: None.     Polyps submitted:                     4   Diverticulosis:    None   Internal Hemorrhoids: Moderate   External Hemorrhoids: Mild       TIMES time  min   PROCEDURE START 1239 TOTAL PROCEDURE TIME 25   CECUM REACHED 1250 TIME TO CECUM 11   PROCEDURE STOP 1304 WITHDRAWAL  TIME 14       DISPOSITION   Follow up/Repeat Colonoscopy:                     Pending pathology review.           Roldan Metcalf III, MD, FAAFP

## 2024-10-24 ENCOUNTER — PATIENT OUTREACH (OUTPATIENT)
Dept: GASTROENTEROLOGY | Facility: CLINIC | Age: 44
End: 2024-10-24
Payer: COMMERCIAL

## 2024-10-30 ENCOUNTER — TELEPHONE (OUTPATIENT)
Dept: FAMILY MEDICINE | Facility: CLINIC | Age: 44
End: 2024-10-30
Payer: COMMERCIAL

## 2024-10-30 DIAGNOSIS — E78.5 HYPERLIPIDEMIA LDL GOAL <100: Primary | ICD-10-CM

## 2024-11-05 ENCOUNTER — LAB (OUTPATIENT)
Dept: LAB | Facility: CLINIC | Age: 44
End: 2024-11-05
Payer: COMMERCIAL

## 2024-11-05 DIAGNOSIS — E78.5 HYPERLIPIDEMIA LDL GOAL <100: ICD-10-CM

## 2024-11-05 LAB
CHOLEST SERPL-MCNC: 261 MG/DL
FASTING STATUS PATIENT QL REPORTED: YES
HDLC SERPL-MCNC: 34 MG/DL
LDLC SERPL CALC-MCNC: ABNORMAL MG/DL
LDLC SERPL DIRECT ASSAY-MCNC: 128 MG/DL
NONHDLC SERPL-MCNC: 227 MG/DL
TRIGL SERPL-MCNC: 588 MG/DL

## 2024-11-05 PROCEDURE — 36415 COLL VENOUS BLD VENIPUNCTURE: CPT

## 2024-11-05 PROCEDURE — 83721 ASSAY OF BLOOD LIPOPROTEIN: CPT | Mod: 59

## 2024-11-05 PROCEDURE — 80061 LIPID PANEL: CPT

## 2024-11-07 DIAGNOSIS — E78.5 HYPERLIPIDEMIA LDL GOAL <100: Primary | ICD-10-CM

## 2024-11-08 ENCOUNTER — LAB (OUTPATIENT)
Dept: LAB | Facility: CLINIC | Age: 44
End: 2024-11-08
Payer: COMMERCIAL

## 2024-11-08 DIAGNOSIS — E78.5 HYPERLIPIDEMIA LDL GOAL <100: ICD-10-CM

## 2024-11-08 LAB
CHOLEST SERPL-MCNC: 261 MG/DL
FASTING STATUS PATIENT QL REPORTED: ABNORMAL
HDLC SERPL-MCNC: 39 MG/DL
LDLC SERPL CALC-MCNC: 152 MG/DL
NONHDLC SERPL-MCNC: 222 MG/DL
TRIGL SERPL-MCNC: 349 MG/DL

## 2024-11-08 PROCEDURE — 36415 COLL VENOUS BLD VENIPUNCTURE: CPT

## 2024-11-08 PROCEDURE — 80061 LIPID PANEL: CPT

## 2024-11-22 ENCOUNTER — HOSPITAL ENCOUNTER (OUTPATIENT)
Dept: CT IMAGING | Facility: CLINIC | Age: 44
Discharge: HOME | End: 2024-11-22
Payer: COMMERCIAL

## 2024-11-22 DIAGNOSIS — E78.5 HYPERLIPIDEMIA LDL GOAL <100: ICD-10-CM

## 2024-11-22 LAB
CV CALCIUM SCORE AGATSTON LM: 0
CV CALCIUM SCORING AGATSON LAD: 0
CV CALCIUM SCORING AGATSTON CX: 0
CV CALCIUM SCORING AGATSTON RCA: 0
CV CALCIUM SCORING AGATSTON TOTAL: 0

## 2024-11-22 PROCEDURE — 75571 CT HRT W/O DYE W/CA TEST: CPT

## 2024-11-22 PROCEDURE — 75571 CT HRT W/O DYE W/CA TEST: CPT | Mod: 26 | Performed by: GENERAL ACUTE CARE HOSPITAL

## 2025-04-07 ENCOUNTER — PATIENT OUTREACH (OUTPATIENT)
Dept: CARE COORDINATION | Facility: CLINIC | Age: 45
End: 2025-04-07
Payer: COMMERCIAL

## 2025-04-21 ENCOUNTER — PATIENT OUTREACH (OUTPATIENT)
Dept: CARE COORDINATION | Facility: CLINIC | Age: 45
End: 2025-04-21
Payer: COMMERCIAL

## 2025-06-08 ENCOUNTER — HEALTH MAINTENANCE LETTER (OUTPATIENT)
Age: 45
End: 2025-06-08

## 2025-08-14 ENCOUNTER — HOSPITAL ENCOUNTER (EMERGENCY)
Facility: CLINIC | Age: 45
Discharge: HOME | End: 2025-08-14
Attending: EMERGENCY MEDICINE
Payer: COMMERCIAL

## 2025-08-14 ENCOUNTER — APPOINTMENT (OUTPATIENT)
Dept: RADIOLOGY | Facility: CLINIC | Age: 45
End: 2025-08-14
Attending: EMERGENCY MEDICINE
Payer: COMMERCIAL

## 2025-08-14 VITALS
HEART RATE: 111 BPM | WEIGHT: 180 LBS | TEMPERATURE: 99.2 F | BODY MASS INDEX: 26.66 KG/M2 | RESPIRATION RATE: 22 BRPM | OXYGEN SATURATION: 98 % | HEIGHT: 69 IN | DIASTOLIC BLOOD PRESSURE: 99 MMHG | SYSTOLIC BLOOD PRESSURE: 178 MMHG

## 2025-08-14 DIAGNOSIS — A41.9 SEPSIS, DUE TO UNSPECIFIED ORGANISM, UNSPECIFIED WHETHER ACUTE ORGAN DYSFUNCTION PRESENT (H): ICD-10-CM

## 2025-08-14 DIAGNOSIS — M71.121 SEPTIC OLECRANON BURSITIS OF RIGHT ELBOW: Primary | ICD-10-CM

## 2025-08-14 LAB
ANION GAP SERPL CALCULATED.3IONS-SCNC: 12 MMOL/L (ref 7–15)
BACTERIA SPEC CULT: NORMAL
BACTERIA SPEC CULT: NORMAL
BASE EXCESS BLDV CALC-SCNC: 2.3 MMOL/L (ref -3–3)
BASOPHILS # BLD AUTO: 0.07 10E3/UL (ref 0–0.2)
BASOPHILS NFR BLD AUTO: 0.6 %
BUN SERPL-MCNC: 12.1 MG/DL (ref 6–20)
CALCIUM SERPL-MCNC: 9.1 MG/DL (ref 8.8–10.4)
CHLORIDE SERPL-SCNC: 102 MMOL/L (ref 98–107)
CREAT SERPL-MCNC: 0.9 MG/DL (ref 0.67–1.17)
EGFRCR SERPLBLD CKD-EPI 2021: >90 ML/MIN/1.73M2
EOSINOPHIL # BLD AUTO: 0.06 10E3/UL (ref 0–0.7)
EOSINOPHIL NFR BLD AUTO: 0.5 %
ERYTHROCYTE [DISTWIDTH] IN BLOOD BY AUTOMATED COUNT: 11.9 % (ref 10–15)
GLUCOSE SERPL-MCNC: 115 MG/DL (ref 70–99)
HCO3 BLDV-SCNC: 27 MMOL/L (ref 21–28)
HCO3 SERPL-SCNC: 24 MMOL/L (ref 22–29)
HCT VFR BLD AUTO: 38.5 % (ref 40–53)
HGB BLD-MCNC: 13.8 G/DL (ref 13.3–17.7)
IMM GRANULOCYTES # BLD: 0.04 10E3/UL
IMM GRANULOCYTES NFR BLD: 0.3 %
LACTATE SERPL-SCNC: 1.7 MMOL/L (ref 0.7–2)
LYMPHOCYTES # BLD AUTO: 1.66 10E3/UL (ref 0.8–5.3)
LYMPHOCYTES NFR BLD AUTO: 13.8 %
MCH RBC QN AUTO: 32.4 PG (ref 26.5–33)
MCHC RBC AUTO-ENTMCNC: 35.8 G/DL (ref 31.5–36.5)
MCV RBC AUTO: 90.4 FL (ref 78–100)
MONOCYTES # BLD AUTO: 0.96 10E3/UL (ref 0–1.3)
MONOCYTES NFR BLD AUTO: 8 %
NEUTROPHILS # BLD AUTO: 9.22 10E3/UL (ref 1.6–8.3)
NEUTROPHILS NFR BLD AUTO: 76.8 %
NRBC # BLD AUTO: <0.03 10E3/UL
NRBC BLD AUTO-RTO: 0 /100
O2/TOTAL GAS SETTING VFR VENT: 21 %
OXYHGB MFR BLDV: 75 % (ref 70–75)
PCO2 BLDV: 40 MM HG (ref 40–50)
PH BLDV: 7.43 [PH] (ref 7.32–7.43)
PLATELET # BLD AUTO: 274 10E3/UL (ref 150–450)
PO2 BLDV: 39 MM HG (ref 25–47)
POTASSIUM SERPL-SCNC: 3.6 MMOL/L (ref 3.4–5.3)
RBC # BLD AUTO: 4.26 10E6/UL (ref 4.4–5.9)
SAO2 % BLDV: 76.3 % (ref 70–75)
SODIUM SERPL-SCNC: 138 MMOL/L (ref 135–145)
WBC # BLD AUTO: 12.01 10E3/UL (ref 4–11)

## 2025-08-14 PROCEDURE — 96375 TX/PRO/DX INJ NEW DRUG ADDON: CPT

## 2025-08-14 PROCEDURE — 80048 BASIC METABOLIC PNL TOTAL CA: CPT | Performed by: EMERGENCY MEDICINE

## 2025-08-14 PROCEDURE — 96374 THER/PROPH/DIAG INJ IV PUSH: CPT

## 2025-08-14 PROCEDURE — 87040 BLOOD CULTURE FOR BACTERIA: CPT | Performed by: EMERGENCY MEDICINE

## 2025-08-14 PROCEDURE — 99284 EMERGENCY DEPT VISIT MOD MDM: CPT | Mod: 25 | Performed by: EMERGENCY MEDICINE

## 2025-08-14 PROCEDURE — 36415 COLL VENOUS BLD VENIPUNCTURE: CPT | Performed by: EMERGENCY MEDICINE

## 2025-08-14 PROCEDURE — 73080 X-RAY EXAM OF ELBOW: CPT | Mod: RT

## 2025-08-14 PROCEDURE — 83605 ASSAY OF LACTIC ACID: CPT | Performed by: EMERGENCY MEDICINE

## 2025-08-14 PROCEDURE — 82805 BLOOD GASES W/O2 SATURATION: CPT | Performed by: EMERGENCY MEDICINE

## 2025-08-14 PROCEDURE — 250N000011 HC RX IP 250 OP 636: Performed by: EMERGENCY MEDICINE

## 2025-08-14 PROCEDURE — 85004 AUTOMATED DIFF WBC COUNT: CPT | Performed by: EMERGENCY MEDICINE

## 2025-08-14 PROCEDURE — 250N000013 HC RX MED GY IP 250 OP 250 PS 637: Performed by: EMERGENCY MEDICINE

## 2025-08-14 RX ORDER — SULFAMETHOXAZOLE AND TRIMETHOPRIM 800; 160 MG/1; MG/1
1 TABLET ORAL 2 TIMES DAILY
Qty: 28 TABLET | Refills: 0 | Status: SHIPPED | OUTPATIENT
Start: 2025-08-14 | End: 2025-08-28

## 2025-08-14 RX ORDER — KETOROLAC TROMETHAMINE 15 MG/ML
15 INJECTION, SOLUTION INTRAMUSCULAR; INTRAVENOUS ONCE
Status: COMPLETED | OUTPATIENT
Start: 2025-08-14 | End: 2025-08-14

## 2025-08-14 RX ORDER — SULFAMETHOXAZOLE AND TRIMETHOPRIM 800; 160 MG/1; MG/1
1 TABLET ORAL ONCE
Status: COMPLETED | OUTPATIENT
Start: 2025-08-14 | End: 2025-08-14

## 2025-08-14 RX ORDER — CEFTRIAXONE 1 G/1
1 INJECTION, POWDER, FOR SOLUTION INTRAMUSCULAR; INTRAVENOUS ONCE
Status: COMPLETED | OUTPATIENT
Start: 2025-08-14 | End: 2025-08-14

## 2025-08-14 RX ADMIN — KETOROLAC TROMETHAMINE 15 MG: 15 INJECTION, SOLUTION INTRAMUSCULAR; INTRAVENOUS at 07:41

## 2025-08-14 RX ADMIN — CEFTRIAXONE 1 G: 1 INJECTION, POWDER, FOR SOLUTION INTRAMUSCULAR; INTRAVENOUS at 07:59

## 2025-08-14 RX ADMIN — SULFAMETHOXAZOLE AND TRIMETHOPRIM 1 TABLET: 800; 160 TABLET ORAL at 09:50

## 2025-08-14 ASSESSMENT — COLUMBIA-SUICIDE SEVERITY RATING SCALE - C-SSRS
1. IN THE PAST MONTH, HAVE YOU WISHED YOU WERE DEAD OR WISHED YOU COULD GO TO SLEEP AND NOT WAKE UP?: NO
6. HAVE YOU EVER DONE ANYTHING, STARTED TO DO ANYTHING, OR PREPARED TO DO ANYTHING TO END YOUR LIFE?: NO
2. HAVE YOU ACTUALLY HAD ANY THOUGHTS OF KILLING YOURSELF IN THE PAST MONTH?: NO

## 2025-08-14 ASSESSMENT — ENCOUNTER SYMPTOMS: CHILLS: 1

## 2025-08-14 ASSESSMENT — ACTIVITIES OF DAILY LIVING (ADL)
ADLS_ACUITY_SCORE: 41

## 2025-08-19 ENCOUNTER — VIRTUAL VISIT (OUTPATIENT)
Dept: FAMILY MEDICINE | Facility: CLINIC | Age: 45
End: 2025-08-19
Payer: COMMERCIAL

## 2025-08-19 DIAGNOSIS — M71.121 SEPTIC OLECRANON BURSITIS, RIGHT: Primary | ICD-10-CM

## 2025-08-19 DIAGNOSIS — R03.0 ELEVATED BLOOD PRESSURE READING WITHOUT DIAGNOSIS OF HYPERTENSION: ICD-10-CM

## 2025-08-19 DIAGNOSIS — F17.298 OTHER TOBACCO PRODUCT NICOTINE DEPENDENCE WITH OTHER NICOTINE-INDUCED DISORDER: ICD-10-CM

## 2025-08-19 LAB
BACTERIA SPEC CULT: NO GROWTH
BACTERIA SPEC CULT: NO GROWTH

## 2025-08-19 PROCEDURE — 98005 SYNCH AUDIO-VIDEO EST LOW 20: CPT | Performed by: FAMILY MEDICINE

## 2025-08-19 ASSESSMENT — PATIENT HEALTH QUESTIONNAIRE - PHQ9
SUM OF ALL RESPONSES TO PHQ QUESTIONS 1-9: 6
10. IF YOU CHECKED OFF ANY PROBLEMS, HOW DIFFICULT HAVE THESE PROBLEMS MADE IT FOR YOU TO DO YOUR WORK, TAKE CARE OF THINGS AT HOME, OR GET ALONG WITH OTHER PEOPLE: SOMEWHAT DIFFICULT
SUM OF ALL RESPONSES TO PHQ QUESTIONS 1-9: 6

## 2025-08-20 ENCOUNTER — PATIENT OUTREACH (OUTPATIENT)
Dept: CARE COORDINATION | Facility: CLINIC | Age: 45
End: 2025-08-20
Payer: COMMERCIAL

## 2025-08-20 ENCOUNTER — TELEPHONE (OUTPATIENT)
Dept: ORTHOPEDICS | Facility: CLINIC | Age: 45
End: 2025-08-20
Payer: COMMERCIAL

## 2025-08-22 ENCOUNTER — PRE VISIT (OUTPATIENT)
Dept: ORTHOPEDICS | Facility: CLINIC | Age: 45
End: 2025-08-22